# Patient Record
Sex: FEMALE | Race: WHITE | NOT HISPANIC OR LATINO | Employment: OTHER | URBAN - METROPOLITAN AREA
[De-identification: names, ages, dates, MRNs, and addresses within clinical notes are randomized per-mention and may not be internally consistent; named-entity substitution may affect disease eponyms.]

---

## 2017-01-11 RX ORDER — BENAZEPRIL HYDROCHLORIDE 5 MG/1
5 TABLET ORAL NIGHTLY
Qty: 90 TABLET | Refills: 3 | Status: SHIPPED | OUTPATIENT
Start: 2017-01-11 | End: 2018-02-04 | Stop reason: SDUPTHER

## 2017-01-23 DIAGNOSIS — F03.91 DEMENTIA WITH BEHAVIORAL DISTURBANCE, UNSPECIFIED DEMENTIA TYPE: ICD-10-CM

## 2017-01-23 RX ORDER — QUETIAPINE FUMARATE 50 MG/1
TABLET, FILM COATED ORAL
Qty: 90 TABLET | Refills: 3 | Status: SHIPPED | OUTPATIENT
Start: 2017-01-23 | End: 2017-06-06 | Stop reason: SDUPTHER

## 2017-03-27 ENCOUNTER — TELEPHONE (OUTPATIENT)
Dept: INTERNAL MEDICINE | Facility: CLINIC | Age: 77
End: 2017-03-27

## 2017-03-27 DIAGNOSIS — Z12.31 VISIT FOR SCREENING MAMMOGRAM: Primary | ICD-10-CM

## 2017-03-27 NOTE — TELEPHONE ENCOUNTER
----- Message from Trip Otto sent at 3/27/2017  8:39 AM CDT -----  Contact: self [phone]  Type: Orders Request    What orders/ testing are being requested? Mammogram     Is there a future appointment scheduled for the patient with PCP? Yes     When? no    Comments: please advise , Thanks !

## 2017-04-04 ENCOUNTER — TELEPHONE (OUTPATIENT)
Dept: INTERNAL MEDICINE | Facility: CLINIC | Age: 77
End: 2017-04-04

## 2017-04-04 ENCOUNTER — HOSPITAL ENCOUNTER (OUTPATIENT)
Dept: RADIOLOGY | Facility: HOSPITAL | Age: 77
Discharge: HOME | End: 2017-04-04
Attending: INTERNAL MEDICINE
Payer: MEDICARE

## 2017-04-04 DIAGNOSIS — Z12.31 VISIT FOR SCREENING MAMMOGRAM: ICD-10-CM

## 2017-04-04 DIAGNOSIS — E78.5 HYPERLIPIDEMIA, UNSPECIFIED HYPERLIPIDEMIA TYPE: Primary | ICD-10-CM

## 2017-04-04 DIAGNOSIS — R73.09 ELEVATED GLUCOSE: ICD-10-CM

## 2017-04-04 PROCEDURE — 77067 SCR MAMMO BI INCL CAD: CPT | Mod: 26,,, | Performed by: RADIOLOGY

## 2017-04-04 PROCEDURE — 77067 SCR MAMMO BI INCL CAD: CPT | Mod: TC

## 2017-04-04 NOTE — TELEPHONE ENCOUNTER
----- Message from Gregg Valdes sent at 4/4/2017 11:00 AM CDT -----  Contact: Amy Daughter -[phone]  Amy is requesting a call back regarding her Mom the above pt, please advice    Thanks

## 2017-04-05 ENCOUNTER — LAB VISIT (OUTPATIENT)
Dept: LAB | Facility: HOSPITAL | Age: 77
End: 2017-04-05
Attending: INTERNAL MEDICINE
Payer: MEDICARE

## 2017-04-05 DIAGNOSIS — E78.5 HYPERLIPIDEMIA, UNSPECIFIED HYPERLIPIDEMIA TYPE: ICD-10-CM

## 2017-04-05 DIAGNOSIS — R73.09 ELEVATED GLUCOSE: ICD-10-CM

## 2017-04-05 LAB
ALBUMIN SERPL BCP-MCNC: 3.3 G/DL
ALP SERPL-CCNC: 97 U/L
ALT SERPL W/O P-5'-P-CCNC: 30 U/L
ANION GAP SERPL CALC-SCNC: 10 MMOL/L
AST SERPL-CCNC: 27 U/L
BILIRUB SERPL-MCNC: 0.5 MG/DL
BUN SERPL-MCNC: 20 MG/DL
CALCIUM SERPL-MCNC: 9.4 MG/DL
CHLORIDE SERPL-SCNC: 103 MMOL/L
CHOLEST/HDLC SERPL: 4.1 {RATIO}
CO2 SERPL-SCNC: 28 MMOL/L
CREAT SERPL-MCNC: 1.1 MG/DL
EST. GFR  (AFRICAN AMERICAN): 56 ML/MIN/1.73 M^2
EST. GFR  (NON AFRICAN AMERICAN): 48.5 ML/MIN/1.73 M^2
ESTIMATED AVG GLUCOSE: 131 MG/DL
GLUCOSE SERPL-MCNC: 138 MG/DL
HBA1C MFR BLD HPLC: 6.2 %
HDL/CHOLESTEROL RATIO: 24.3 %
HDLC SERPL-MCNC: 185 MG/DL
HDLC SERPL-MCNC: 45 MG/DL
LDLC SERPL CALC-MCNC: 96.6 MG/DL
NONHDLC SERPL-MCNC: 140 MG/DL
POTASSIUM SERPL-SCNC: 4.3 MMOL/L
PROT SERPL-MCNC: 7.1 G/DL
SODIUM SERPL-SCNC: 141 MMOL/L
TRIGL SERPL-MCNC: 217 MG/DL

## 2017-04-05 PROCEDURE — 83036 HEMOGLOBIN GLYCOSYLATED A1C: CPT

## 2017-04-05 PROCEDURE — 36415 COLL VENOUS BLD VENIPUNCTURE: CPT

## 2017-04-05 PROCEDURE — 80061 LIPID PANEL: CPT

## 2017-04-05 PROCEDURE — 80053 COMPREHEN METABOLIC PANEL: CPT

## 2017-04-28 ENCOUNTER — OFFICE VISIT (OUTPATIENT)
Dept: OPHTHALMOLOGY | Facility: CLINIC | Age: 77
End: 2017-04-28
Payer: MEDICARE

## 2017-04-28 DIAGNOSIS — Z96.1 PSEUDOPHAKIA OF BOTH EYES: ICD-10-CM

## 2017-04-28 DIAGNOSIS — H35.3190 NONEXUDATIVE AGE-RELATED MACULAR DEGENERATION: ICD-10-CM

## 2017-04-28 DIAGNOSIS — H35.3132 NONEXUDATIVE AGE-RELATED MACULAR DEGENERATION, BILATERAL, INTERMEDIATE DRY STAGE: ICD-10-CM

## 2017-04-28 DIAGNOSIS — H35.371 EPIRETINAL MEMBRANE, RIGHT: ICD-10-CM

## 2017-04-28 DIAGNOSIS — H35.363 DRUSEN OF MACULA, BILATERAL: ICD-10-CM

## 2017-04-28 DIAGNOSIS — H40.89 GLAUCOMA DUE TO COMBINATION OF MECHANISMS: Primary | ICD-10-CM

## 2017-04-28 PROCEDURE — 99999 PR PBB SHADOW E&M-EST. PATIENT-LVL III: CPT | Mod: PBBFAC,,, | Performed by: OPHTHALMOLOGY

## 2017-04-28 PROCEDURE — 92014 COMPRE OPH EXAM EST PT 1/>: CPT | Mod: S$GLB,,, | Performed by: OPHTHALMOLOGY

## 2017-04-28 RX ORDER — TIMOLOL MALEATE 5 MG/ML
1 SOLUTION OPHTHALMIC DAILY
Qty: 15 ML | Refills: 3 | Status: SHIPPED | OUTPATIENT
Start: 2017-04-28 | End: 2017-09-26

## 2017-04-28 RX ORDER — BIMATOPROST 0.1 MG/ML
1 SOLUTION/ DROPS OPHTHALMIC EVERY MORNING
Qty: 10 ML | Refills: 3 | Status: SHIPPED | OUTPATIENT
Start: 2017-04-28 | End: 2017-11-09 | Stop reason: SDUPTHER

## 2017-04-28 NOTE — PROGRESS NOTES
HPI     Glaucoma    Additional comments: IOP ck and DFE           Comments   DLS: 10/10/16  (Pt with Alzheimer's and does not communicate much anymore)    1) MMG  2) PCIOL OU  3) ERM OD  4) ARMD OS>OD  5) Alzheimer's    MED:  Timolol GFS QAM OU  Lumigan QHS OU       Last edited by Jackie Deutsch MA on 4/28/2017  8:24 AM. (History)            Assessment /Plan     For exam results, see Encounter Report.    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  Combination Mechanism Glaucoma -  Moderate stage        First HVF 2012        First photos 1/13/2012        H/O PI OD - done pre - [facility] - elsewhere        PI OS [facility] 12/22/1011           Family history    neg        Glaucoma meds    T 0.5 gf ou / Lumigan ou q hs        H/O adverse rxn to glaucoma drops    none        LASERS    PI OD - elsewhere // PI OS 12/22/2011        GLAUCOMA SURGERIES    none        OTHER EYE SURGERIES    PC IOL OS 4/11/2012 - [facility] // PC IOL OD - 6/24/2013        CDR    0.5/0.3        Tbase    24-29 / 22-25          Tmax    29/25            Ttarget    /             HVF    6 tests 2012 to 2016 -SAD / NS OD  // no longer can do test os - inattentive - 2/2 alzheimer                   NO FURTHER VF TESTING - PT CAN NO LONGER FOCUS ON TEST          Gonio    +3 / NA to +3        CCT    508 / 515 (thin ou)(+2 ou)        OCT    3 test 2012  To 2015 - RNFL - OD:dec. I // OS:nl        HRT    3 test 2011 to  2015 - MR - nl od / nl os (NO MORE TESTING)         Disc photos    2012, 2013 - [facility]    - Ttoday   15/16  - Test done today    DFE/IOP check      2. ERM OD   3. ARMD OU   4. PC IOL OS 4/11/2012 - [facility]  SN60WF 21.5 D  Note: + post pressure    PC IOL OD 6/24/2013   5.  Refractive error          S/p  CE w/ IOL insertion OU   6. Early Alzheimers  Did ok with local for CE in the first eye  4/11/2012        PLAN      [facility] // S/P PI's ou   - continue timolol gfs  Ou q am   - cont  lumigan OU   IOPs ok  ou  today      - ARMD OS>OD, nonexudative  - AREDS vitamins  - Cont Amsler monitoring  - OCT macula showing ERM OD with traction and ARMD without any exudate- can continue to follow with Dr. Grimm    - cont timolol gfs  ou q am   - cont lumigan ou q hs (runs out a wk early)(try and get them to give 10 mls q 3 months instead of 7.5 mls)     F/U - 9-12 months Gonio/IOP check     NO FURTHER VF TESTING - PT NOT ATTENTIVE ENOUGH TO DO TEST       Following with Dr. Grimm  for dry armd ou / ERM od - yearly exams     I have seen and personally examined the patient.  I agree with the findings, assessment and plan of the resident and/or fellow.     Karina Gordon MD

## 2017-05-08 RX ORDER — QUETIAPINE FUMARATE 25 MG/1
25 TABLET, FILM COATED ORAL 3 TIMES DAILY
Qty: 270 TABLET | Refills: 3 | Status: SHIPPED | OUTPATIENT
Start: 2017-05-08 | End: 2017-06-06 | Stop reason: SDUPTHER

## 2017-06-06 ENCOUNTER — OFFICE VISIT (OUTPATIENT)
Dept: INTERNAL MEDICINE | Facility: CLINIC | Age: 77
End: 2017-06-06
Payer: MEDICARE

## 2017-06-06 VITALS
HEART RATE: 68 BPM | HEIGHT: 60 IN | DIASTOLIC BLOOD PRESSURE: 70 MMHG | SYSTOLIC BLOOD PRESSURE: 114 MMHG | BODY MASS INDEX: 30.29 KG/M2 | WEIGHT: 154.31 LBS

## 2017-06-06 DIAGNOSIS — F03.918 NEURODEGENERATIVE DEMENTIA WITH BEHAVIORAL DISTURBANCE: Primary | ICD-10-CM

## 2017-06-06 DIAGNOSIS — I10 ESSENTIAL HYPERTENSION: ICD-10-CM

## 2017-06-06 DIAGNOSIS — F03.91 DEMENTIA WITH BEHAVIORAL DISTURBANCE, UNSPECIFIED DEMENTIA TYPE: ICD-10-CM

## 2017-06-06 DIAGNOSIS — E78.5 HYPERLIPIDEMIA, UNSPECIFIED HYPERLIPIDEMIA TYPE: ICD-10-CM

## 2017-06-06 DIAGNOSIS — R01.1 CARDIAC MURMUR: ICD-10-CM

## 2017-06-06 PROCEDURE — 99499 UNLISTED E&M SERVICE: CPT | Mod: S$GLB,,, | Performed by: INTERNAL MEDICINE

## 2017-06-06 PROCEDURE — 1159F MED LIST DOCD IN RCRD: CPT | Mod: S$GLB,,, | Performed by: INTERNAL MEDICINE

## 2017-06-06 PROCEDURE — 99999 PR PBB SHADOW E&M-EST. PATIENT-LVL III: CPT | Mod: PBBFAC,,, | Performed by: INTERNAL MEDICINE

## 2017-06-06 PROCEDURE — 1126F AMNT PAIN NOTED NONE PRSNT: CPT | Mod: S$GLB,,, | Performed by: INTERNAL MEDICINE

## 2017-06-06 PROCEDURE — 99214 OFFICE O/P EST MOD 30 MIN: CPT | Mod: S$GLB,,, | Performed by: INTERNAL MEDICINE

## 2017-06-06 PROCEDURE — 1157F ADVNC CARE PLAN IN RCRD: CPT | Mod: S$GLB,,, | Performed by: INTERNAL MEDICINE

## 2017-06-06 RX ORDER — QUETIAPINE FUMARATE 50 MG/1
50 TABLET, FILM COATED ORAL NIGHTLY
Qty: 90 TABLET | Refills: 3 | Status: SHIPPED | OUTPATIENT
Start: 2017-06-06 | End: 2018-06-25 | Stop reason: SDUPTHER

## 2017-06-06 RX ORDER — HYDROCHLOROTHIAZIDE 12.5 MG/1
12.5 TABLET ORAL DAILY
Qty: 90 TABLET | Refills: 3 | Status: SHIPPED | OUTPATIENT
Start: 2017-06-06 | End: 2018-02-22 | Stop reason: SDUPTHER

## 2017-06-06 RX ORDER — QUETIAPINE FUMARATE 25 MG/1
25 TABLET, FILM COATED ORAL
Qty: 360 TABLET | Refills: 3 | Status: SHIPPED | OUTPATIENT
Start: 2017-06-06 | End: 2018-05-07 | Stop reason: SDUPTHER

## 2017-06-06 RX ORDER — MEMANTINE HYDROCHLORIDE 10 MG/1
10 TABLET ORAL 2 TIMES DAILY
Qty: 180 TABLET | Refills: 3 | Status: SHIPPED | OUTPATIENT
Start: 2017-06-06 | End: 2018-02-08 | Stop reason: SDUPTHER

## 2017-06-06 RX ORDER — DONEPEZIL HYDROCHLORIDE 10 MG/1
TABLET, FILM COATED ORAL
Qty: 90 TABLET | Refills: 3 | Status: SHIPPED | OUTPATIENT
Start: 2017-06-06 | End: 2018-06-25 | Stop reason: SDUPTHER

## 2017-06-06 RX ORDER — SIMVASTATIN 40 MG/1
40 TABLET, FILM COATED ORAL NIGHTLY
Qty: 90 TABLET | Refills: 3 | Status: SHIPPED | OUTPATIENT
Start: 2017-06-06 | End: 2018-06-11 | Stop reason: SDUPTHER

## 2017-06-06 NOTE — PROGRESS NOTES
CHIEF COMPLAINT:  Followup.    HISTORY OF PRESENT ILLNESS:  The patient is a 76-year-old female who we see for   hypertension, hyperlipidemia as well as dementia, who comes in today for routine   followup.  She is here today with her daughter.  No new complaints except for   problems with her medications, all her medications that are in the capsule form.    She has been chewing and spitting out.  This includes her HCTZ as well as her   Namenda.  The patient is taking Seroquel, which helped with her mood to   stabilize it.  Her daughter is currently giving her 25 mg at breakfast, lunch   and dinner as well as 75 mg at bedtime.  In regards to her blood pressure, she   does check it, meaning the patient's daughter checks the patient's blood   pressure once a week, it is usually in the 120s/70s.    REVIEW OF SYSTEMS:  Not able to properly obtain from the patient due to her   dementia.  Please see review of systems that was provided by the daughter.  The   patient is eating well and having good bowel movements.  No complaints.    PHYSICAL EXAMINATION:  GENERAL APPEARANCE:  No acute distress.  HEENT:  Trachea is midline without JVD.  PULMONARY:  Good inspiratory, expiratory breath sounds are heard.  Lungs are   clear to auscultation.  CARDIOVASCULAR:  S1, S2 with a I-II/VI systolic ejection murmur heard best at   the right upper sternal border.  EXTREMITIES:  Without edema.  ABDOMEN:  Nontender, nondistended, without hepatosplenomegaly.    ASSESSMENT:  1.  Dementia.  2.  Hypertension.  3.  Hyperlipidemia.  4.  Cardiac murmur.    PLAN:  We will put the patient in for a cardiac echo.  The patient already had   blood tests done in April.  We will change her HCTZ from capsule to a tablet.    We will also change her Namenda from once a day capsule to 10 mg b.i.d.      JDS/IN  dd: 06/06/2017 09:33:10 (CDT)  td: 06/07/2017 05:01:36 (CDT)  Doc ID   #[id number]  Job ID #[id number]    CC:       Answers for HPI/ROS submitted by the patient  on 6/5/2017   activity change: No  unexpected weight change: No  neck pain: No  hearing loss: No  rhinorrhea: No  trouble swallowing: No  eye discharge: No  visual disturbance: No  chest tightness: No  wheezing: No  chest pain: No  palpitations: No  blood in stool: No  constipation: No  vomiting: No  diarrhea: No  polydipsia: No  polyuria: No  difficulty urinating: No  hematuria: No  menstrual problem: No  dysuria: No  joint swelling: No  arthralgias: No  headaches: No  weakness: No  confusion: No  dysphoric mood: No

## 2017-06-12 ENCOUNTER — HOSPITAL ENCOUNTER (OUTPATIENT)
Dept: CARDIOLOGY | Facility: CLINIC | Age: 77
Discharge: HOME | End: 2017-06-12
Payer: MEDICARE

## 2017-06-12 DIAGNOSIS — R01.1 CARDIAC MURMUR: ICD-10-CM

## 2017-06-12 LAB
AORTIC VALVE STENOSIS: ABNORMAL
ESTIMATED PA SYSTOLIC PRESSURE: 32.95
MITRAL VALVE STENOSIS: ABNORMAL
RETIRED EF AND QEF - SEE NOTES: 60 (ref 55–65)
TRICUSPID VALVE REGURGITATION: ABNORMAL

## 2017-06-12 PROCEDURE — 93306 TTE W/DOPPLER COMPLETE: CPT | Mod: S$GLB,,, | Performed by: INTERNAL MEDICINE

## 2017-06-13 ENCOUNTER — PATIENT MESSAGE (OUTPATIENT)
Dept: ADMINISTRATIVE | Facility: OTHER | Age: 77
End: 2017-06-13

## 2017-08-15 ENCOUNTER — HOSPITAL ENCOUNTER (OUTPATIENT)
Dept: RADIOLOGY | Facility: HOSPITAL | Age: 77
Discharge: HOME | End: 2017-08-15
Attending: ORTHOPAEDIC SURGERY
Payer: MEDICARE

## 2017-08-15 ENCOUNTER — OFFICE VISIT (OUTPATIENT)
Dept: INTERNAL MEDICINE | Facility: CLINIC | Age: 77
End: 2017-08-15
Payer: MEDICARE

## 2017-08-15 ENCOUNTER — OFFICE VISIT (OUTPATIENT)
Dept: ORTHOPEDICS | Facility: CLINIC | Age: 77
End: 2017-08-15
Payer: MEDICARE

## 2017-08-15 VITALS
DIASTOLIC BLOOD PRESSURE: 62 MMHG | SYSTOLIC BLOOD PRESSURE: 118 MMHG | BODY MASS INDEX: 30.55 KG/M2 | HEIGHT: 60 IN | HEART RATE: 80 BPM | WEIGHT: 155.63 LBS

## 2017-08-15 VITALS — HEIGHT: 60 IN | WEIGHT: 155.63 LBS | BODY MASS INDEX: 30.55 KG/M2

## 2017-08-15 DIAGNOSIS — G89.29 CHRONIC PAIN OF RIGHT KNEE: ICD-10-CM

## 2017-08-15 DIAGNOSIS — H40.89 GLAUCOMA DUE TO COMBINATION OF MECHANISMS: ICD-10-CM

## 2017-08-15 DIAGNOSIS — H35.3132 NONEXUDATIVE AGE-RELATED MACULAR DEGENERATION, BILATERAL, INTERMEDIATE DRY STAGE: ICD-10-CM

## 2017-08-15 DIAGNOSIS — I77.9 BILATERAL CAROTID ARTERY DISEASE: ICD-10-CM

## 2017-08-15 DIAGNOSIS — M17.0 PRIMARY OSTEOARTHRITIS OF BOTH KNEES: ICD-10-CM

## 2017-08-15 DIAGNOSIS — M25.561 CHRONIC PAIN OF RIGHT KNEE: ICD-10-CM

## 2017-08-15 DIAGNOSIS — I70.0 AORTIC CALCIFICATION: ICD-10-CM

## 2017-08-15 DIAGNOSIS — Z96.652 TOTAL KNEE REPLACEMENT STATUS, LEFT: Primary | ICD-10-CM

## 2017-08-15 DIAGNOSIS — N18.30 CKD (CHRONIC KIDNEY DISEASE) STAGE 3, GFR 30-59 ML/MIN: ICD-10-CM

## 2017-08-15 DIAGNOSIS — Z96.652 TOTAL KNEE REPLACEMENT STATUS, LEFT: ICD-10-CM

## 2017-08-15 DIAGNOSIS — I10 ESSENTIAL HYPERTENSION: ICD-10-CM

## 2017-08-15 DIAGNOSIS — D32.9 MENINGIOMA: ICD-10-CM

## 2017-08-15 DIAGNOSIS — E66.9 OBESITY, UNSPECIFIED OBESITY SEVERITY, UNSPECIFIED OBESITY TYPE: ICD-10-CM

## 2017-08-15 DIAGNOSIS — F03.918 NEURODEGENERATIVE DEMENTIA WITH BEHAVIORAL DISTURBANCE: ICD-10-CM

## 2017-08-15 DIAGNOSIS — I35.0 MILD AORTIC STENOSIS: ICD-10-CM

## 2017-08-15 DIAGNOSIS — E78.5 HYPERLIPIDEMIA, UNSPECIFIED HYPERLIPIDEMIA TYPE: ICD-10-CM

## 2017-08-15 DIAGNOSIS — Z00.00 ENCOUNTER FOR PREVENTIVE HEALTH EXAMINATION: Primary | ICD-10-CM

## 2017-08-15 DIAGNOSIS — M81.0 OSTEOPOROSIS, UNSPECIFIED OSTEOPOROSIS TYPE, UNSPECIFIED PATHOLOGICAL FRACTURE PRESENCE: ICD-10-CM

## 2017-08-15 PROCEDURE — 99499 UNLISTED E&M SERVICE: CPT | Mod: S$GLB,,, | Performed by: NURSE PRACTITIONER

## 2017-08-15 PROCEDURE — 99213 OFFICE O/P EST LOW 20 MIN: CPT | Mod: S$GLB,,, | Performed by: ORTHOPAEDIC SURGERY

## 2017-08-15 PROCEDURE — 1159F MED LIST DOCD IN RCRD: CPT | Mod: S$GLB,,, | Performed by: ORTHOPAEDIC SURGERY

## 2017-08-15 PROCEDURE — 3008F BODY MASS INDEX DOCD: CPT | Mod: S$GLB,,, | Performed by: ORTHOPAEDIC SURGERY

## 2017-08-15 PROCEDURE — 3074F SYST BP LT 130 MM HG: CPT | Mod: S$GLB,,, | Performed by: ORTHOPAEDIC SURGERY

## 2017-08-15 PROCEDURE — 73560 X-RAY EXAM OF KNEE 1 OR 2: CPT | Mod: 26,59,RT, | Performed by: RADIOLOGY

## 2017-08-15 PROCEDURE — 73562 X-RAY EXAM OF KNEE 3: CPT | Mod: 26,LT,, | Performed by: RADIOLOGY

## 2017-08-15 PROCEDURE — 1126F AMNT PAIN NOTED NONE PRSNT: CPT | Mod: S$GLB,,, | Performed by: ORTHOPAEDIC SURGERY

## 2017-08-15 PROCEDURE — 99999 PR PBB SHADOW E&M-EST. PATIENT-LVL IV: CPT | Mod: PBBFAC,,, | Performed by: NURSE PRACTITIONER

## 2017-08-15 PROCEDURE — 3078F DIAST BP <80 MM HG: CPT | Mod: S$GLB,,, | Performed by: ORTHOPAEDIC SURGERY

## 2017-08-15 PROCEDURE — G0439 PPPS, SUBSEQ VISIT: HCPCS | Mod: S$GLB,,, | Performed by: NURSE PRACTITIONER

## 2017-08-15 PROCEDURE — 99999 PR PBB SHADOW E&M-EST. PATIENT-LVL II: CPT | Mod: PBBFAC,,, | Performed by: ORTHOPAEDIC SURGERY

## 2017-08-15 PROCEDURE — 1157F ADVNC CARE PLAN IN RCRD: CPT | Mod: S$GLB,,, | Performed by: ORTHOPAEDIC SURGERY

## 2017-08-15 NOTE — PROGRESS NOTES
Layla Arellano presented for a  Medicare AWV and comprehensive Health Risk Assessment today. The following components were reviewed and updated:    · Medical history  · Family History  · Social history  · Allergies and Current Medications  · Health Risk Assessment  · Health Maintenance  · Care Team     ** See Completed Assessments for Annual Wellness Visit within the encounter summary.**       The following assessments were completed:  · Living Situation  · CAGE  · Depression Screening  · Timed Get Up and Go  · Whisper Test  · Cognitive Function Screening  · Nutrition Screening  · ADL Screening  · PAQ Screening    Vitals:    08/15/17 0905   BP: 118/62   BP Location: Left arm   Patient Position: Sitting   Pulse: 80   Weight: 70.6 kg (155 lb 10.3 oz)   Height: 5' (1.524 m)     Body mass index is 30.4 kg/m².     Physical Exam   Constitutional: She appears well-developed. No distress.   HENT:   Head: Normocephalic and atraumatic.   Cardiovascular: Normal rate and regular rhythm.    Murmur heard.  Pulmonary/Chest: Effort normal and breath sounds normal. No respiratory distress. She has no wheezes. She has no rales.   Musculoskeletal: She exhibits no edema, tenderness or deformity.   Neurological: She is alert.   Memory loss, unable to answer questions.   Skin: Skin is warm and dry. She is not diaphoretic.   Psychiatric: She has a normal mood and affect. Her behavior is normal.   Vitals reviewed.        Diagnoses and health risks identified today and associated recommendations/orders:    1. Encounter for preventive health examination  Tdap today.  Dexa scan due 2018.    2. Meningioma  Stable.   Seen on MRI from 5/4/2015.  Followed by Neurology.     3. Bilateral carotid artery disease  Stable.   Seen on imaging from 3/26/2014.  Followed by PCP.     4. Aortic calcification  Stable.   Seen on imaging from 8/29/2016.  Followed by PCP.     5. 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   Continue current  medications.  Followed by Ophthalmology.     6. Nonexudative age-related macular degeneration, bilateral, intermediate dry stage  Stable.   Followed by Ophthalmology.     7. Essential hypertension  Stable.   Continue current medications.  Followed by PCP.     8. Neurodegenerative dementia with behavioral disturbance  Stable.   Continue current medications.  Followed by Neurology.     9. Hyperlipidemia, unspecified hyperlipidemia type  Stable.   Continue current medication.  Followed by PCP.     10. CKD (chronic kidney disease) stage 3, GFR 30-59 ml/min  Stable.   Avoid NSAIDs.   Followed by PCP.     11. Mild aortic stenosis  Stable.   Followed by PCP.     12. Osteoporosis, unspecified osteoporosis type, unspecified pathological fracture presence  Stable.   Continue current medication.  Followed by PCP.     13. Primary osteoarthritis of both knees  Stable.   Followed by Ortho.     14. Obesity, unspecified obesity severity, unspecified obesity type  Stable.   Low fat diet.   Followed by PCP.       Provided Layla with a 5-10 year written screening schedule and personal prevention plan. Recommendations were developed using the USPSTF age appropriate recommendations. Education, counseling, and referrals were provided as needed. After Visit Summary printed and given to patient which includes a list of additional screenings\tests needed.    Return in about 3 months (around 11/13/2017) for follow up with PCP. Return in 1 year for HRA..    Gladys Vazquez NP

## 2017-08-15 NOTE — PATIENT INSTRUCTIONS
Counseling and Referral of Other Preventative  (Italic type indicates deductible and co-insurance are waived)    Patient Name: Layla Arellano  Today's Date: 8/15/2017      SERVICE LIMITATIONS RECOMMENDATION    Vaccines    · Pneumococcal (once after 65)    · Influenza (annually)    · Hepatitis B (if medium/high risk)    · Prevnar 13      Hepatitis B medium/high risk factors:       - End-stage renal disease       - Hemophiliacs who received Factor VII or         IX concentrates       - Clients of institutions for the mentally             retarded       - Persons who live in the same house as          a HepB carrier       - Homosexual men       - Illicit injectable drug abusers     Pneumococcal: Done, no repeat necessary     Influenza: Due fall      Hepatitis B: N/A     Prevnar 13: Done, no repeat necessary    Mammogram (biennial age 50-74)  Annually (age 40 or over)  Last done 4/2017, recommend to repeat every year    Pap (up to age 70 and after 70 if unknown history or abnormal study last 10 years)    N/A        Colorectal cancer screening (to age 75)    · Fecal occult blood test (annual)  · Flexible sigmoidoscopy (5y)  · Screening colonoscopy (10y)  · Barium enema   N/A    Diabetes self-management training (no USPSTF recommendations)  Requires referral by treating physician for patient with diabetes or renal disease. 10 hours of initial DSMT sessions of no less than 30 minutes each in a continuous 12-month period. 2 hours of follow-up DSMT in subsequent years.  N/A    Bone mass measurements (age 65 & older, biennial)  Requires diagnosis related to osteoporosis or estrogen deficiency. Biennial benefit unless patient has history of long-term glucocorticoid  Last done 4/2016, recommend to repeat every 2  years    Glaucoma screening (no USPSTF recommendation)  Diabetes mellitus, family history   , age 50 or over    American, age 65 or over  Last done 4/2017, recommend to repeat every year     Medical nutrition therapy for diabetes or renal disease (no recommended schedule)  Requires referral by treating physician for patient with diabetes or renal disease or kidney transplant within the past 3 years.  Can be provided in same year as diabetes self-management training (DSMT), and CMS recommends medical nutrition therapy take place after DSMT. Up to 3 hours for initial year and 2 hours in subsequent years.  N/A    Cardiovascular screening blood tests (every 5 years)  · Fasting lipid panel  Order as a panel if possible  Done this year, repeat every year    Diabetes screening tests (at least every 3 years, Medicare covers annually or at 6-month intervals for prediabetic patients)  · Fasting blood sugar (FBS) or glucose tolerance test (GTT)  Patient must be diagnosed with one of the following:       - Hypertension       - Dyslipidemia       - Obesity (BMI 30kg/m2)       - Previous elevated impaired FBS or GTT       ... or any two of the following:       - Overweight (BMI 25 but <30)       - Family history of diabetes       - Age 65 or older       - History of gestational diabetes or birth of baby weighing more than 9 pounds  Done 4/2017, repeat in 6 months    HIV screening (annually for increased risk patients)  · HIV-1 and HIV-2 by EIA, or ELDER, rapid antibody test or oral mucosa transudate  Patients must be at increased risk for HIV infection per USPSTF guidelines or pregnant. Tests covered annually for patient at increased risk or as requested by the patient. Pregnant patients may receive up to 3 tests during pregnancy.  Risks discussed, screening is not recommended    Smoking cessation counseling (up to 8 sessions per year)  Patients must be asymptomatic of tobacco-related conditions to receive as a preventative service.  Non-smoker    Subsequent annual wellness visit  At least 12 months since last AWV  Return in one year     The following information is provided to all patients.  This information is to  help you find resources for any of the problems found today that may be affecting your health:                Living healthy guide: www.[facility].[place].[facility]      Understanding Diabetes: www.diabetes.org      Eating healthy: www.cdc.gov/healthyweight      CDC home safety checklist: www.cdc.gov/steadi/patient.html      Agency on Aging: www.goea.[place].[facility]      Alcoholics anonymous (AA): www.aa.org      Physical Activity: www.ezra.nih.gov/kx1fcbs      Tobacco use: www.quitwithusla.org

## 2017-08-15 NOTE — PROGRESS NOTES
HPI:    Layla Arellano is a 76 y.o. female who is here today for   Chief Complaint   Patient presents with    Right Knee - Pain    left knee     TKR 9-12-16   .  Patient is 1 year out from a left total knee and doing well.  She has dementia and is unable to give a clear history.  Family states that she is limping and having a lot of trouble with her right knee.    Duration: 6 months  Intensity: moderate  Character of pain: sharp  Location: She reports that the pain is predominately  medial    Past Medical History:   Diagnosis Date    Alzheimer's disease     Arthritis     bilateral knee pain    CKD (chronic kidney disease) stage 3, GFR 30-59 ml/min 6/12/2015    Dementia         Dementia     Glaucoma     Heart murmur     Aortic stenosis    Hyperlipidemia     Hypertension     Macular degeneration - Both Eyes 5/10/2013    Osteoporosis 5/3/2016          Current Outpatient Prescriptions:     alendronate (FOSAMAX) 70 MG tablet, TAKE 1 TABLET EVERY 7 DAYS. SEE PACKAGE FOR ADDITIONAL INSTRUCTIONS, Disp: 12 tablet, Rfl: 3    benazepril (LOTENSIN) 5 MG tablet, Take 1 tablet (5 mg total) by mouth every evening., Disp: 90 tablet, Rfl: 3    BETA-CAROTENE,A, W-C & E/MIN (OCUVITE ORAL), Take by mouth once daily. , Disp: , Rfl:     calcium carbonate (OS-MARIO) 500 mg calcium (1,250 mg) tablet, Take 1 tablet by mouth once daily., Disp: , Rfl:     donepezil (ARICEPT) 10 MG tablet, TAKE 1 TABLET ONE TIME DAILY, Disp: 90 tablet, Rfl: 3    hydrochlorothiazide (HYDRODIURIL) 12.5 MG Tab, Take 1 tablet (12.5 mg total) by mouth once daily., Disp: 90 tablet, Rfl: 3    LUMIGAN 0.01 % Drop, Place 1 drop into both eyes every morning. Disp 4 bottles of 2.5 mls each or 2 bottles of 5 mls each for 3 months., Disp: 10 mL, Rfl: 3    memantine (NAMENDA) 10 MG Tab, Take 1 tablet (10 mg total) by mouth 2 (two) times daily., Disp: 180 tablet, Rfl: 3    nystatin-triamcinolone (MYCOLOG) ointment, APPLY TOPICALLY 2 (TWO) TIMES  DAILY., Disp: 30 g, Rfl: 2    quetiapine (SEROQUEL) 25 MG Tab, Take 1 tablet (25 mg total) by mouth 4 (four) times daily before meals and nightly., Disp: 360 tablet, Rfl: 3    quetiapine (SEROQUEL) 50 MG tablet, Take 1 tablet (50 mg total) by mouth every evening., Disp: 90 tablet, Rfl: 3    simvastatin (ZOCOR) 40 MG tablet, Take 1 tablet (40 mg total) by mouth every evening., Disp: 90 tablet, Rfl: 3    timolol maleate 0.5% (TIMOPTIC-XE) 0.5 % SolG, Place 1 drop into both eyes once daily. Disp 90 day suply, Disp: 15 mL, Rfl: 3    vitamin D 1000 units Tab, Take 185 mg by mouth every evening. , Disp: , Rfl:      Review of patient's allergies indicates:  No Known Allergies     ROS  Constitutional: Negative for fever, Negative for weight loss  HENT Negative for congestion  Cardiovascular: Negative chest pain  Respiratory: Negative Shortness of breath  Heme: Negative excessive bleeding  Skin:NegativeItching, Negative breakdown  Musculoskeletal:Negative for back pain, Positive for joint pain, Negative muscle pain, Negative muscle weakness  Neurological: Negative for numbness and paresthesias   Psychiatric/Behavioral: Positive altered mental status, Negative for depression    Physical Exam:   Ht 5' (1.524 m)   Wt 70.6 kg (155 lb 10.3 oz)   BMI 30.40 kg/m²   General appearance: This is a well-developed, Well nourished female No obvious acute distress.  Psychiatric: normal mood and affect;  pleasant and conversant; judgment and thought content normal  Gait is coordinated. Patient has antalgic gait to the right  Cardiovascular: There are no swelling or varicosities present.   Respiratory: normal respiratory effort   Lymphatic: no adenopathy   Neurologic: alert and oriented to person, place, and time   Deep Tendon Reflexes are normal;  Coordination and Balance: Normal   Musculoskeletal   Neck    ROM shows normal flexion and extension and lateral rotation    Palpation: Non-tender    Stability is normal    Strength is  normal    Skin is normal without masses and lesions    Sensation is intact to light touch   Back    ROM showsnormal flexion, extension    and  rotation    Palpation shows no masses    Stability is normal    Strength to flexion and extension well maintained    Core strength is diminished    Skin shows no rashes or cafe au lait spots;     Sensation is intact to light touch    Right Knee  Swelling Mild  TendernessMedial joint line  Range of Motion: 120    Left Knee: Swelling None  TendernessNone  Range of Motion: 110    Radiograph   Osteoarthritis: severe  Angle: mild varus    Assessment:   Well-healed left total knee.  Right knee has bone-on-bone arthritis but is still quite functional.  Plan:   No surgical intervention at this time.

## 2017-09-26 RX ORDER — TIMOLOL MALEATE 5 MG/ML
SOLUTION/ DROPS OPHTHALMIC
Qty: 15 ML | Refills: 3 | Status: SHIPPED | OUTPATIENT
Start: 2017-09-26 | End: 2018-06-11 | Stop reason: SDUPTHER

## 2017-09-28 DIAGNOSIS — L30.4 INTERTRIGO: ICD-10-CM

## 2017-09-29 NOTE — TELEPHONE ENCOUNTER
----- Message from Hailey Rose sent at 9/29/2017  9:39 AM CDT -----  Call patient about prescription that was not called in. Call [phone] or [phone]

## 2017-10-03 DIAGNOSIS — L30.4 INTERTRIGO: ICD-10-CM

## 2017-10-03 RX ORDER — NYSTATIN AND TRIAMCINOLONE ACETONIDE 100000; 1 [USP'U]/G; MG/G
OINTMENT TOPICAL
Qty: 30 G | Refills: 2 | Status: SHIPPED | OUTPATIENT
Start: 2017-10-03 | End: 2018-03-20 | Stop reason: SDUPTHER

## 2017-10-03 NOTE — TELEPHONE ENCOUNTER
----- Message from Grace iWlson sent at 10/3/2017  9:12 AM CDT -----  Contact: pts daughter Amy   LISA-pt- pts daughter is calling to speak with the nurse in ref to a prescription refill the Amy said it wasn't called in yet. Nystatin triamcinolne ointment . Pt goes to [facility] on [facility]. Can you please call Amy at [phone] or [phone]    ANA

## 2017-11-09 ENCOUNTER — TELEPHONE (OUTPATIENT)
Dept: NEUROLOGY | Facility: CLINIC | Age: 77
End: 2017-11-09

## 2017-11-09 DIAGNOSIS — H40.89 GLAUCOMA DUE TO COMBINATION OF MECHANISMS: ICD-10-CM

## 2017-11-09 RX ORDER — BIMATOPROST 0.1 MG/ML
SOLUTION/ DROPS OPHTHALMIC
Qty: 2.5 ML | Refills: 12 | Status: SHIPPED | OUTPATIENT
Start: 2017-11-09 | End: 2018-04-20 | Stop reason: SDUPTHER

## 2017-11-13 ENCOUNTER — IMMUNIZATION (OUTPATIENT)
Dept: INTERNAL MEDICINE | Facility: CLINIC | Age: 77
End: 2017-11-13
Payer: MEDICARE

## 2017-11-13 ENCOUNTER — LAB VISIT (OUTPATIENT)
Dept: LAB | Facility: HOSPITAL | Age: 77
End: 2017-11-13
Attending: INTERNAL MEDICINE
Payer: MEDICARE

## 2017-11-13 ENCOUNTER — OFFICE VISIT (OUTPATIENT)
Dept: INTERNAL MEDICINE | Facility: CLINIC | Age: 77
End: 2017-11-13
Payer: MEDICARE

## 2017-11-13 VITALS
HEART RATE: 85 BPM | OXYGEN SATURATION: 96 % | BODY MASS INDEX: 30.52 KG/M2 | SYSTOLIC BLOOD PRESSURE: 118 MMHG | WEIGHT: 155.44 LBS | DIASTOLIC BLOOD PRESSURE: 70 MMHG | HEIGHT: 60 IN

## 2017-11-13 DIAGNOSIS — I10 ESSENTIAL HYPERTENSION: ICD-10-CM

## 2017-11-13 DIAGNOSIS — G30.9 ALZHEIMER'S DEMENTIA WITH BEHAVIORAL DISTURBANCE, UNSPECIFIED TIMING OF DEMENTIA ONSET: ICD-10-CM

## 2017-11-13 DIAGNOSIS — E78.5 HYPERLIPIDEMIA, UNSPECIFIED HYPERLIPIDEMIA TYPE: ICD-10-CM

## 2017-11-13 DIAGNOSIS — Z00.00 ANNUAL PHYSICAL EXAM: ICD-10-CM

## 2017-11-13 DIAGNOSIS — Z00.00 ANNUAL PHYSICAL EXAM: Primary | ICD-10-CM

## 2017-11-13 DIAGNOSIS — F02.81 ALZHEIMER'S DEMENTIA WITH BEHAVIORAL DISTURBANCE, UNSPECIFIED TIMING OF DEMENTIA ONSET: ICD-10-CM

## 2017-11-13 LAB
ALBUMIN SERPL BCP-MCNC: 3.5 G/DL
ALP SERPL-CCNC: 87 U/L
ALT SERPL W/O P-5'-P-CCNC: 42 U/L
ANION GAP SERPL CALC-SCNC: 11 MMOL/L
AST SERPL-CCNC: 27 U/L
BASOPHILS # BLD AUTO: 0.05 K/UL
BASOPHILS NFR BLD: 0.4 %
BILIRUB SERPL-MCNC: 0.4 MG/DL
BUN SERPL-MCNC: 17 MG/DL
CALCIUM SERPL-MCNC: 10.1 MG/DL
CHLORIDE SERPL-SCNC: 102 MMOL/L
CHOLEST SERPL-MCNC: 195 MG/DL
CHOLEST/HDLC SERPL: 4.2 {RATIO}
CO2 SERPL-SCNC: 28 MMOL/L
CREAT SERPL-MCNC: 1 MG/DL
DIFFERENTIAL METHOD: ABNORMAL
EOSINOPHIL # BLD AUTO: 0.3 K/UL
EOSINOPHIL NFR BLD: 2.4 %
ERYTHROCYTE [DISTWIDTH] IN BLOOD BY AUTOMATED COUNT: 14.3 %
EST. GFR  (AFRICAN AMERICAN): >60 ML/MIN/1.73 M^2
EST. GFR  (NON AFRICAN AMERICAN): 54 ML/MIN/1.73 M^2
GLUCOSE SERPL-MCNC: 144 MG/DL
HCT VFR BLD AUTO: 47 %
HDLC SERPL-MCNC: 46 MG/DL
HDLC SERPL: 23.6 %
HGB BLD-MCNC: 15.3 G/DL
LDLC SERPL CALC-MCNC: 104.2 MG/DL
LYMPHOCYTES # BLD AUTO: 3.5 K/UL
LYMPHOCYTES NFR BLD: 25.1 %
MCH RBC QN AUTO: 29.3 PG
MCHC RBC AUTO-ENTMCNC: 32.6 G/DL
MCV RBC AUTO: 90 FL
MONOCYTES # BLD AUTO: 0.9 K/UL
MONOCYTES NFR BLD: 6.5 %
NEUTROPHILS # BLD AUTO: 9.1 K/UL
NEUTROPHILS NFR BLD: 65.3 %
NONHDLC SERPL-MCNC: 149 MG/DL
PLATELET # BLD AUTO: 276 K/UL
PMV BLD AUTO: 12.8 FL
POTASSIUM SERPL-SCNC: 4.3 MMOL/L
PROT SERPL-MCNC: 7.4 G/DL
RBC # BLD AUTO: 5.23 M/UL
SODIUM SERPL-SCNC: 141 MMOL/L
TRIGL SERPL-MCNC: 224 MG/DL
WBC # BLD AUTO: 13.92 K/UL

## 2017-11-13 PROCEDURE — 85025 COMPLETE CBC W/AUTO DIFF WBC: CPT

## 2017-11-13 PROCEDURE — G0008 ADMIN INFLUENZA VIRUS VAC: HCPCS | Mod: S$GLB,,, | Performed by: INTERNAL MEDICINE

## 2017-11-13 PROCEDURE — 36415 COLL VENOUS BLD VENIPUNCTURE: CPT

## 2017-11-13 PROCEDURE — 99999 PR PBB SHADOW E&M-EST. PATIENT-LVL III: CPT | Mod: PBBFAC,,, | Performed by: INTERNAL MEDICINE

## 2017-11-13 PROCEDURE — 99499 UNLISTED E&M SERVICE: CPT | Mod: S$GLB,,, | Performed by: INTERNAL MEDICINE

## 2017-11-13 PROCEDURE — 99397 PER PM REEVAL EST PAT 65+ YR: CPT | Mod: S$GLB,,, | Performed by: INTERNAL MEDICINE

## 2017-11-13 PROCEDURE — 80053 COMPREHEN METABOLIC PANEL: CPT

## 2017-11-13 PROCEDURE — 80061 LIPID PANEL: CPT

## 2017-11-13 PROCEDURE — 90662 IIV NO PRSV INCREASED AG IM: CPT | Mod: S$GLB,,, | Performed by: INTERNAL MEDICINE

## 2017-11-14 ENCOUNTER — PATIENT MESSAGE (OUTPATIENT)
Dept: DERMATOLOGY | Facility: CLINIC | Age: 77
End: 2017-11-14

## 2017-11-14 ENCOUNTER — PATIENT MESSAGE (OUTPATIENT)
Dept: INTERNAL MEDICINE | Facility: CLINIC | Age: 77
End: 2017-11-14

## 2017-11-14 DIAGNOSIS — D72.829 LEUKOCYTOSIS, UNSPECIFIED TYPE: ICD-10-CM

## 2017-11-14 DIAGNOSIS — R73.09 ELEVATED GLUCOSE: Primary | ICD-10-CM

## 2017-11-14 RX ORDER — NYSTATIN AND TRIAMCINOLONE ACETONIDE 100000; 1 [USP'U]/G; MG/G
OINTMENT TOPICAL
Qty: 30 G | Refills: 1 | Status: ON HOLD | OUTPATIENT
Start: 2017-11-14 | End: 2019-06-30 | Stop reason: HOSPADM

## 2017-11-14 NOTE — PROGRESS NOTES
CHIEF COMPLAINT:  Physical exam.    HISTORY OF PRESENT ILLNESS:  The patient is a 77-year-old female who comes in   today for annual physical exam.  The patient is currently being treated for   dementia.  Her symptoms appear to be getting worse.  Her daughter reports she is   less verbal.  She also is having problem with hygiene.  Her daughter now to   clean her up, still have accidents, but she is not violent.    REVIEW OF SYSTEMS:  Unable to properly obtain secondary to the patient's   Alzheimer's.  The patient did have a mammogram in April of this year.    PHYSICAL EXAMINATION:  GENERAL APPEARANCE:  No acute distress.  The patient appears to be well taken   care of.  HEENT:  Conjunctivae clear.  She does have bilateral lens replacements.  TMs are   clear.  Nasal septum is midline without discharge.  Oropharynx is without   erythema.  NECK:  Trachea is midline without JVD, without thyromegaly.  PULMONARY:  Good inspiratory, expiratory breath sounds are heard.  Lungs are   clear to auscultation.  CARDIOVASCULAR:  S1, S2 with a 1/6 systolic ejection murmur.  EXTREMITIES:  With trace edema.  ABDOMEN:  Nontender, nondistended, without hepatosplenomegaly.    ASSESSMENT:  1.  Physical exam.  2.  Hypertension.  3.  Hyperlipidemia.  4.  Alzheimer dementia.    PLAN:  We will put the patient in for CBC, CMP, and lipid panel.  She is to   follow up pending results.      KELSEY/REENA  dd: 11/14/2017 07:31:48 (CST)  td: 11/15/2017 02:09:13 (CST)  Doc ID   #[id number]  Job ID #[id number]    CC:

## 2017-11-16 ENCOUNTER — PATIENT MESSAGE (OUTPATIENT)
Dept: INTERNAL MEDICINE | Facility: CLINIC | Age: 77
End: 2017-11-16

## 2017-11-16 ENCOUNTER — LAB VISIT (OUTPATIENT)
Dept: LAB | Facility: HOSPITAL | Age: 77
End: 2017-11-16
Attending: INTERNAL MEDICINE
Payer: MEDICARE

## 2017-11-16 DIAGNOSIS — D72.829 LEUKOCYTOSIS, UNSPECIFIED TYPE: Primary | ICD-10-CM

## 2017-11-16 DIAGNOSIS — D72.829 LEUKOCYTOSIS, UNSPECIFIED TYPE: ICD-10-CM

## 2017-11-16 DIAGNOSIS — R73.09 ELEVATED GLUCOSE: ICD-10-CM

## 2017-11-16 DIAGNOSIS — N39.0 ACUTE UTI: ICD-10-CM

## 2017-11-16 LAB
BASOPHILS # BLD AUTO: 0.05 K/UL
BASOPHILS NFR BLD: 0.3 %
DIFFERENTIAL METHOD: ABNORMAL
EOSINOPHIL # BLD AUTO: 0.4 K/UL
EOSINOPHIL NFR BLD: 3 %
ERYTHROCYTE [DISTWIDTH] IN BLOOD BY AUTOMATED COUNT: 14.3 %
ESTIMATED AVG GLUCOSE: 143 MG/DL
HBA1C MFR BLD HPLC: 6.6 %
HCT VFR BLD AUTO: 47.8 %
HGB BLD-MCNC: 15.6 G/DL
LYMPHOCYTES # BLD AUTO: 4.2 K/UL
LYMPHOCYTES NFR BLD: 28.6 %
MCH RBC QN AUTO: 29.2 PG
MCHC RBC AUTO-ENTMCNC: 32.6 G/DL
MCV RBC AUTO: 90 FL
MONOCYTES # BLD AUTO: 1.5 K/UL
MONOCYTES NFR BLD: 10 %
NEUTROPHILS # BLD AUTO: 8.5 K/UL
NEUTROPHILS NFR BLD: 57.8 %
PLATELET # BLD AUTO: 279 K/UL
PMV BLD AUTO: 12.9 FL
RBC # BLD AUTO: 5.34 M/UL
WBC # BLD AUTO: 14.64 K/UL

## 2017-11-16 PROCEDURE — 83036 HEMOGLOBIN GLYCOSYLATED A1C: CPT

## 2017-11-16 PROCEDURE — 85025 COMPLETE CBC W/AUTO DIFF WBC: CPT

## 2017-11-16 PROCEDURE — 36415 COLL VENOUS BLD VENIPUNCTURE: CPT

## 2017-11-16 RX ORDER — CIPROFLOXACIN 750 MG/1
750 TABLET, FILM COATED ORAL DAILY
Qty: 7 TABLET | Refills: 0 | Status: SHIPPED | OUTPATIENT
Start: 2017-11-16 | End: 2017-12-18

## 2017-11-20 NOTE — TELEPHONE ENCOUNTER
Spoke with pt's daughter and informed her that Dr. Han is out of town but I will return her call about doing the in and out catherization once med's are finish once I hear by from Dr. Han.

## 2017-11-28 ENCOUNTER — TELEPHONE (OUTPATIENT)
Dept: INTERNAL MEDICINE | Facility: CLINIC | Age: 77
End: 2017-11-28

## 2017-11-28 NOTE — TELEPHONE ENCOUNTER
----- Message from Vera Almanza sent at 11/28/2017  1:04 PM CST -----  Contact: Amy/tona/[phone] or [phone]/cell  Amy called in regards needing to talk with Dr Han nurse about if patient have to go back to have some blood work because she finished her antibiotics. Please call and advise.       Thank you!!!

## 2017-12-04 ENCOUNTER — TELEPHONE (OUTPATIENT)
Dept: NEUROLOGY | Facility: CLINIC | Age: 77
End: 2017-12-04

## 2017-12-04 NOTE — TELEPHONE ENCOUNTER
Called patient's daughter to inform appointment on 12\12 will be cancelled. Offered to assist scheduling   With other providers; daughter stated needs after January. Advised if she would like to wait for Vi, she may have availability come January. patient daughter agreed.

## 2017-12-18 ENCOUNTER — OFFICE VISIT (OUTPATIENT)
Dept: OPHTHALMOLOGY | Facility: CLINIC | Age: 77
End: 2017-12-18
Payer: MEDICARE

## 2017-12-18 DIAGNOSIS — Z96.1 PSEUDOPHAKIA OF BOTH EYES: ICD-10-CM

## 2017-12-18 DIAGNOSIS — H35.3132 NONEXUDATIVE AGE-RELATED MACULAR DEGENERATION, BILATERAL, INTERMEDIATE DRY STAGE: Primary | ICD-10-CM

## 2017-12-18 DIAGNOSIS — H40.89 GLAUCOMA DUE TO COMBINATION OF MECHANISMS: ICD-10-CM

## 2017-12-18 PROCEDURE — 92226 PR SPECIAL EYE EXAM, SUBSEQUENT: CPT | Mod: 50,S$GLB,, | Performed by: OPHTHALMOLOGY

## 2017-12-18 PROCEDURE — 99999 PR PBB SHADOW E&M-EST. PATIENT-LVL III: CPT | Mod: PBBFAC,,, | Performed by: OPHTHALMOLOGY

## 2017-12-18 PROCEDURE — 92012 INTRM OPH EXAM EST PATIENT: CPT | Mod: S$GLB,,, | Performed by: OPHTHALMOLOGY

## 2017-12-18 NOTE — PROGRESS NOTES
HPI     Yearly ARMD ck   DLS- 12/14/2016 Dr. Lama    78 Y/O F w/ Dementia is here today with her daughter ( Amy) for her 1   yr ARMD CK.    Pt Daughter states she doesn't talk much so she is uncertain about change   in vision.  Pt's family notes no change in visual behavior.  She does not   seem to complain about eyes.      (-)Flashes (-)Floaters  (-)Photophobia  (-)Glare  Takes Ocuvite daily    MED:   Timolol BID OU   Lumigan QHS OS     POHx:   1) MMG   2) PCIOL OU   3) ERM OD   4) Early Alzheimer's       Negative Family History     Dementia = living with daughter.         Last edited by Michael Lama MD on 12/18/2017  5:17 PM. (History)        Unable to obtain OCT secondary to cooperation    Assessment /Plan     For exam results, see Encounter Report.    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 oGrdon  Can try to schedule followups between the two of us yearly, but 6 months apart

## 2017-12-19 RX ORDER — ALENDRONATE SODIUM 70 MG/1
TABLET ORAL
Qty: 12 TABLET | Refills: 3 | Status: SHIPPED | OUTPATIENT
Start: 2017-12-19 | End: 2018-08-29 | Stop reason: SDUPTHER

## 2018-02-05 ENCOUNTER — OFFICE VISIT (OUTPATIENT)
Dept: NEUROLOGY | Facility: CLINIC | Age: 78
End: 2018-02-05
Payer: MEDICARE

## 2018-02-05 VITALS
DIASTOLIC BLOOD PRESSURE: 81 MMHG | SYSTOLIC BLOOD PRESSURE: 147 MMHG | HEIGHT: 60 IN | WEIGHT: 153.88 LBS | BODY MASS INDEX: 30.21 KG/M2 | HEART RATE: 101 BPM

## 2018-02-05 DIAGNOSIS — F03.918 NEURODEGENERATIVE DEMENTIA WITH BEHAVIORAL DISTURBANCE: Primary | ICD-10-CM

## 2018-02-05 PROCEDURE — 3008F BODY MASS INDEX DOCD: CPT | Mod: S$GLB,,, | Performed by: NURSE PRACTITIONER

## 2018-02-05 PROCEDURE — 1126F AMNT PAIN NOTED NONE PRSNT: CPT | Mod: S$GLB,,, | Performed by: NURSE PRACTITIONER

## 2018-02-05 PROCEDURE — 99999 PR PBB SHADOW E&M-EST. PATIENT-LVL III: CPT | Mod: PBBFAC,,, | Performed by: NURSE PRACTITIONER

## 2018-02-05 PROCEDURE — 99213 OFFICE O/P EST LOW 20 MIN: CPT | Mod: S$GLB,,, | Performed by: NURSE PRACTITIONER

## 2018-02-05 PROCEDURE — 99499 UNLISTED E&M SERVICE: CPT | Mod: S$GLB,,, | Performed by: NURSE PRACTITIONER

## 2018-02-05 PROCEDURE — 1159F MED LIST DOCD IN RCRD: CPT | Mod: S$GLB,,, | Performed by: NURSE PRACTITIONER

## 2018-02-05 RX ORDER — BENAZEPRIL HYDROCHLORIDE 5 MG/1
5 TABLET ORAL NIGHTLY
Qty: 90 TABLET | Refills: 3 | Status: SHIPPED | OUTPATIENT
Start: 2018-02-05 | End: 2019-01-23 | Stop reason: SDUPTHER

## 2018-02-05 NOTE — PROGRESS NOTES
Name: Layla Arellano  MRN: [id number]   CSN: [id number]      Date: 2-5-18      Referring physician:  Mushtaq Self  No address on file    Subjective:      Chief Complaint:     History of Present Illness (HPI):    Layla Arellano is a 77 y.o. left-handed female who presents today for a follow-up evaluation of Dementia and is accompanied by daughter, Amy. Last seen in office in 2016. At that time, Aricept and Namenda cont without change. Seroquel cont for behavioral issues that remained.   She lives with Amy who is her primary caregiver (24/7) and her family. She gets a little help from one of Amy's sisters (Sarita) every couple of weeks. There is another son and daughter who have not spoken to or not seen her in two years.   Taking quetiapine during day and at bedtime. All of her meds prescribed by PCP.   Daughter does not think she recognizes her but seems to feel comfortable with her.     Appetite great. She does feed herself.   She does not verbalize when she is hungry. In fact, does not speak much at all.   She is led to the bedroom for bedtime.   Does not appear to get lost inside of home.   Does not wander.   Generally in a fairly good mood. Coloring is her hobby.   She is able to walk but needs to be prompted.   Able to dress herself but not undress herself. She does not pick out her own clothes but is not choosy about what her daughter sets out for her.   She sometimes assists with bathing and brushing teeth.   She does not have any problems with swallowing or choking.   Must be prompted to go to the bathroom every few hours but still continent of bowel and bladder. No longer able to wipe after toileting.     No constipation or diarrhea.  No pain.   Weight stable.      Pt not able to answer ROS.   Review of Systems    Past Medical History: The patient  has a past medical history of Alzheimer's disease; Arthritis; CKD (chronic kidney disease) stage 3, GFR 30-59 ml/min (6/12/2015); Dementia; Dementia;  Glaucoma; Heart murmur; Hyperlipidemia; Hypertension; Macular degeneration - Both Eyes (5/10/2013); Obesity (8/15/2017); and Osteoporosis (5/3/2016).    Social History: The patient  reports that she has never smoked. She has never used smokeless tobacco. She reports that she does not drink alcohol or use drugs.    Family History: Their family history includes Cancer (age of onset: 70) in her mother; Glaucoma in her maternal grandmother; Heart attack (age of onset: 50) in her father; Heart disease in her daughter; Stroke in her mother.    Allergies: Patient has no known allergies.     Meds:   Current Outpatient Prescriptions on File Prior to Visit   Medication Sig Dispense Refill    alendronate (FOSAMAX) 70 MG tablet TAKE 1 TABLET EVERY 7 DAYS. SEE PACKAGE FOR ADDITIONAL INSTRUCTIONS 12 tablet 3    BETA-CAROTENE,A, W-C & E/MIN (OCUVITE ORAL) Take by mouth once daily.       calcium carbonate (OS-MARIO) 500 mg calcium (1,250 mg) tablet Take 1 tablet by mouth once daily.      donepezil (ARICEPT) 10 MG tablet TAKE 1 TABLET ONE TIME DAILY 90 tablet 3    hydrochlorothiazide (HYDRODIURIL) 12.5 MG Tab Take 1 tablet (12.5 mg total) by mouth once daily. 90 tablet 3    LUMIGAN 0.01 % Drop INSTILL 1 DROP INTO BOTH EYES IN THE MORNING 2.5 mL 12    memantine (NAMENDA) 10 MG Tab Take 1 tablet (10 mg total) by mouth 2 (two) times daily. 180 tablet 3    nystatin-triamcinolone (MYCOLOG) ointment APPLY TOPICALLY TO AFFECTED AREA 2 (TWO) TIMES DAILY. 30 g 1    nystatin-triamcinolone (MYCOLOG) ointment APPLY TOPICALLY 2 (TWO) TIMES DAILY. 30 g 2    quetiapine (SEROQUEL) 25 MG Tab Take 1 tablet (25 mg total) by mouth 4 (four) times daily before meals and nightly. 360 tablet 3    quetiapine (SEROQUEL) 50 MG tablet Take 1 tablet (50 mg total) by mouth every evening. 90 tablet 3    simvastatin (ZOCOR) 40 MG tablet Take 1 tablet (40 mg total) by mouth every evening. 90 tablet 3    timolol maleate 0.5% (TIMOPTIC) 0.5 % Drop INSTILL  1 DROP INTO BOTH EYES EVERY MORNING 15 mL 3    vitamin D 1000 units Tab Take 185 mg by mouth every evening.       benazepril (LOTENSIN) 5 MG tablet Take 1 tablet (5 mg total) by mouth every evening. 90 tablet 3     No current facility-administered medications on file prior to visit.        Objective:     Physical Exam:    Vitals:    02/05/18 0907   BP: (!) 147/81   Pulse: 101   Weight: 69.8 kg (153 lb 14.1 oz)   Height: 5' (1.524 m)     Body mass index is 30.05 kg/m².    Constitutional  Well-developed, well-nourished, appears stated age, well groomed, calm.   Cardiovascular  Radial pulses 2+ and symmetric, no LE edema bilaterally     ..  Neurological    * Mental status  MOCA = deferred   - Orientation Not oriented, not even to person. When asked her name, she laughs and says she does not know anymore. This is the only time she is verbal during visit. Smiles periodically. Holds coloring book and pencils and smiles, shows these to me.      - Memory     Impaired     - Attention/concentration  Decreased     - Language  Essentially non-verbal     - Fund of knowledge  Impaired     - Executive  Impaired   GAIT  Arises with assist. Smiles but appears guarded when she walks, relies on assist of dtr to hold her hand and tell her what to do.                Laboratory Results:  Lab Visit on 11/16/2017   Component Date Value Ref Range Status    WBC 11/16/2017 14.64* 3.90 - 12.70 K/uL Final    RBC 11/16/2017 5.34  4.00 - 5.40 M/uL Final    Hemoglobin 11/16/2017 15.6  12.0 - 16.0 g/dL Final    Hematocrit 11/16/2017 47.8  37.0 - 48.5 % Final    MCV 11/16/2017 90  82 - 98 fL Final    MCH 11/16/2017 29.2  27.0 - 31.0 pg Final    MCHC 11/16/2017 32.6  32.0 - 36.0 g/dL Final    RDW 11/16/2017 14.3  11.5 - 14.5 % Final    Platelets 11/16/2017 279  150 - 350 K/uL Final    MPV 11/16/2017 12.9  9.2 - 12.9 fL Final    Gran # (ANC) 11/16/2017 8.5* 1.8 - 7.7 K/uL Final    Lymph # 11/16/2017 4.2  1.0 - 4.8 K/uL Final    Mono #  11/16/2017 1.5* 0.3 - 1.0 K/uL Final    Eos # 11/16/2017 0.4  0.0 - 0.5 K/uL Final    Baso # 11/16/2017 0.05  0.00 - 0.20 K/uL Final    Gran% 11/16/2017 57.8  38.0 - 73.0 % Final    Lymph% 11/16/2017 28.6  18.0 - 48.0 % Final    Mono% 11/16/2017 10.0  4.0 - 15.0 % Final    Eosinophil% 11/16/2017 3.0  0.0 - 8.0 % Final    Basophil% 11/16/2017 0.3  0.0 - 1.9 % Final    Differential Method 11/16/2017 Automated   Final    Hemoglobin A1C 11/16/2017 6.6* 4.0 - 5.6 % Final    Estimated Avg Glucose 11/16/2017 143* 68 - 131 mg/dL Final   Lab Visit on 11/13/2017   Component Date Value Ref Range Status    Sodium 11/13/2017 141  136 - 145 mmol/L Final    Potassium 11/13/2017 4.3  3.5 - 5.1 mmol/L Final    Chloride 11/13/2017 102  95 - 110 mmol/L Final    CO2 11/13/2017 28  23 - 29 mmol/L Final    Glucose 11/13/2017 144* 70 - 110 mg/dL Final    BUN, Bld 11/13/2017 17  8 - 23 mg/dL Final    Creatinine 11/13/2017 1.0  0.5 - 1.4 mg/dL Final    Calcium 11/13/2017 10.1  8.7 - 10.5 mg/dL Final    Total Protein 11/13/2017 7.4  6.0 - 8.4 g/dL Final    Albumin 11/13/2017 3.5  3.5 - 5.2 g/dL Final    Total Bilirubin 11/13/2017 0.4  0.1 - 1.0 mg/dL Final    Alkaline Phosphatase 11/13/2017 87  55 - 135 U/L Final    AST 11/13/2017 27  10 - 40 U/L Final    ALT 11/13/2017 42  10 - 44 U/L Final    Anion Gap 11/13/2017 11  8 - 16 mmol/L Final    eGFR if African American 11/13/2017 >60  >60 mL/min/1.73 m^2 Final    eGFR if non African American 11/13/2017 54* >60 mL/min/1.73 m^2 Final    Cholesterol 11/13/2017 195  120 - 199 mg/dL Final    Triglycerides 11/13/2017 224* 30 - 150 mg/dL Final    HDL 11/13/2017 46  40 - 75 mg/dL Final    LDL Cholesterol 11/13/2017 104.2  63.0 - 159.0 mg/dL Final    HDL/Chol Ratio 11/13/2017 23.6  20.0 - 50.0 % Final    Total Cholesterol/HDL Ratio 11/13/2017 4.2  2.0 - 5.0 Final    Non-HDL Cholesterol 11/13/2017 149  mg/dL Final    WBC 11/13/2017 13.92* 3.90 - 12.70 K/uL Final    RBC  11/13/2017 5.23  4.00 - 5.40 M/uL Final    Hemoglobin 11/13/2017 15.3  12.0 - 16.0 g/dL Final    Hematocrit 11/13/2017 47.0  37.0 - 48.5 % Final    MCV 11/13/2017 90  82 - 98 fL Final    MCH 11/13/2017 29.3  27.0 - 31.0 pg Final    MCHC 11/13/2017 32.6  32.0 - 36.0 g/dL Final    RDW 11/13/2017 14.3  11.5 - 14.5 % Final    Platelets 11/13/2017 276  150 - 350 K/uL Final    MPV 11/13/2017 12.8  9.2 - 12.9 fL Final    Gran # (ANC) 11/13/2017 9.1* 1.8 - 7.7 K/uL Final    Lymph # 11/13/2017 3.5  1.0 - 4.8 K/uL Final    Mono # 11/13/2017 0.9  0.3 - 1.0 K/uL Final    Eos # 11/13/2017 0.3  0.0 - 0.5 K/uL Final    Baso # 11/13/2017 0.05  0.00 - 0.20 K/uL Final    Gran% 11/13/2017 65.3  38.0 - 73.0 % Final    Lymph% 11/13/2017 25.1  18.0 - 48.0 % Final    Mono% 11/13/2017 6.5  4.0 - 15.0 % Final    Eosinophil% 11/13/2017 2.4  0.0 - 8.0 % Final    Basophil% 11/13/2017 0.4  0.0 - 1.9 % Final    Differential Method 11/13/2017 Automated   Final       Imaging:   From 1-2015                  Assessment and Plan     Neurodegenerative dementia with behavioral disturbance        Medical Decision Making:    Ms. Arellano's dementia is consistent with AD.   Continue meds without change.   Call for problems.   Follow up annually, unless needed sooner.  Discussed caregiver burden with dtr. Discussed possible services that may be able to assist give her respite when needed.        I spent 20 minutes face-to-face with the patient with >50% of the time spent with counseling and education regarding:  - results of data, diagnosis, and recommendations stated above  - the prognosis of dementia  - rationale of memory meds    Vi D. Knoop, DNP, NP-C  Division of Movement and Memory Disorders  [facility]  [phone]

## 2018-02-09 RX ORDER — MEMANTINE HYDROCHLORIDE 10 MG/1
TABLET ORAL
Qty: 180 TABLET | Refills: 3 | Status: SHIPPED | OUTPATIENT
Start: 2018-02-09 | End: 2018-08-29 | Stop reason: SDUPTHER

## 2018-02-22 RX ORDER — HYDROCHLOROTHIAZIDE 12.5 MG/1
CAPSULE ORAL
Qty: 90 CAPSULE | Refills: 1 | OUTPATIENT
Start: 2018-02-22

## 2018-02-22 RX ORDER — HYDROCHLOROTHIAZIDE 12.5 MG/1
TABLET ORAL
Qty: 90 TABLET | Refills: 3 | Status: SHIPPED | OUTPATIENT
Start: 2018-02-22 | End: 2018-08-29 | Stop reason: SDUPTHER

## 2018-03-05 ENCOUNTER — TELEPHONE (OUTPATIENT)
Dept: INTERNAL MEDICINE | Facility: CLINIC | Age: 78
End: 2018-03-05

## 2018-03-05 NOTE — TELEPHONE ENCOUNTER
----- Message from Layla Metz sent at 3/5/2018  7:38 AM CST -----  Contact: daughter/ida/[phone]  Pt daughter called in regards to the pt has a sore on her left leg.      Please advise

## 2018-03-06 ENCOUNTER — OFFICE VISIT (OUTPATIENT)
Dept: INTERNAL MEDICINE | Facility: CLINIC | Age: 78
End: 2018-03-06
Payer: MEDICARE

## 2018-03-06 VITALS
HEART RATE: 70 BPM | SYSTOLIC BLOOD PRESSURE: 124 MMHG | HEIGHT: 60 IN | WEIGHT: 154 LBS | BODY MASS INDEX: 30.23 KG/M2 | DIASTOLIC BLOOD PRESSURE: 62 MMHG

## 2018-03-06 DIAGNOSIS — L98.9 SKIN SORE: Primary | ICD-10-CM

## 2018-03-06 DIAGNOSIS — I10 ESSENTIAL HYPERTENSION: ICD-10-CM

## 2018-03-06 DIAGNOSIS — E78.5 HYPERLIPIDEMIA, UNSPECIFIED HYPERLIPIDEMIA TYPE: ICD-10-CM

## 2018-03-06 DIAGNOSIS — G30.9 ALZHEIMER'S DEMENTIA WITH BEHAVIORAL DISTURBANCE, UNSPECIFIED TIMING OF DEMENTIA ONSET: ICD-10-CM

## 2018-03-06 DIAGNOSIS — F02.81 ALZHEIMER'S DEMENTIA WITH BEHAVIORAL DISTURBANCE, UNSPECIFIED TIMING OF DEMENTIA ONSET: ICD-10-CM

## 2018-03-06 PROCEDURE — 3078F DIAST BP <80 MM HG: CPT | Mod: S$GLB,,, | Performed by: INTERNAL MEDICINE

## 2018-03-06 PROCEDURE — 99999 PR PBB SHADOW E&M-EST. PATIENT-LVL III: CPT | Mod: PBBFAC,,, | Performed by: INTERNAL MEDICINE

## 2018-03-06 PROCEDURE — 99499 UNLISTED E&M SERVICE: CPT | Mod: S$GLB,,, | Performed by: INTERNAL MEDICINE

## 2018-03-06 PROCEDURE — 99213 OFFICE O/P EST LOW 20 MIN: CPT | Mod: S$GLB,,, | Performed by: INTERNAL MEDICINE

## 2018-03-06 PROCEDURE — 3074F SYST BP LT 130 MM HG: CPT | Mod: S$GLB,,, | Performed by: INTERNAL MEDICINE

## 2018-03-06 RX ORDER — CEPHALEXIN 500 MG/1
500 CAPSULE ORAL EVERY 8 HOURS
Qty: 21 CAPSULE | Refills: 0 | Status: SHIPPED | OUTPATIENT
Start: 2018-03-06 | End: 2018-03-07 | Stop reason: ALTCHOICE

## 2018-03-06 NOTE — PROGRESS NOTES
CHIEF COMPLAINT:  Sore on leg.    HISTORY OF PRESENT ILLNESS:  The patient is a 77-year-old female with Alzheimer   dementia who comes in today with her daughter who reports that she noticed a   sore on her mom's left leg near the hip.  Her other sister was watching her mom   for the weekend.  No history of trauma.  She notices sore when the patient came   back to her home.  She has been using triple antibiotic ointment, is not getting   any better.    REVIEW OF SYSTEMS:  Unable to properly obtain secondary to the patient's   Alzheimer's for the patient does not indicate any pain.    PHYSICAL EXAMINATION:  PULMONARY:  Good inspiratory, expiratory breath sounds are heard.  Lungs are   clear to auscultation.  CARDIOVASCULAR:  S1, S2.  EXTREMITIES:  Without edema.  ABDOMEN:  Normal bowel sounds.  The patient's left leg was evaluated.  The patient had a 1 cm sore.  It appeared   to be clean based.  There was no exudate, no pus, and no pustule.    ASSESSMENT:  1.  Sore on left leg.  2.  Alzheimer dementia.  3.  Hypertension, currently stable.  4.  Hyperlipidemia.    PLAN:  The patient is to keep the wound clean with just plain soap and water and   use hydrogen peroxide and will keep it covered with a sterile bandage.  We will   put her on Keflex three times a day for the next seven days.      KELSEY/REENA  dd: 03/06/2018 10:16:22 (CST)  td: 03/07/2018 03:09:18 (CST)  Doc ID   #[id number]  Job ID #[id number]    CC:

## 2018-03-07 ENCOUNTER — TELEPHONE (OUTPATIENT)
Dept: INTERNAL MEDICINE | Facility: CLINIC | Age: 78
End: 2018-03-07

## 2018-03-07 RX ORDER — CEPHALEXIN 250 MG/5ML
500 POWDER, FOR SUSPENSION ORAL 3 TIMES DAILY
Qty: 210 ML | Refills: 0 | Status: SHIPPED | OUTPATIENT
Start: 2018-03-07 | End: 2018-04-23

## 2018-03-07 NOTE — TELEPHONE ENCOUNTER
----- Message from Layla David sent at 3/7/2018 11:55 AM CST -----  Contact: Amy/ [phone] or cell [phone]  Patient cannot swallow capsules and she has called several times to ask that you order the antibiotic in a tablet / cephalexin 500mg. If this doesn't come in tablet form please order something else that is in a tablet. She said that this is her third call.    [facility] Prabhjot y [phone]

## 2018-03-08 ENCOUNTER — PATIENT MESSAGE (OUTPATIENT)
Dept: INTERNAL MEDICINE | Facility: CLINIC | Age: 78
End: 2018-03-08

## 2018-03-20 DIAGNOSIS — L30.4 INTERTRIGO: ICD-10-CM

## 2018-03-20 RX ORDER — NYSTATIN AND TRIAMCINOLONE ACETONIDE 100000; 1 [USP'U]/G; MG/G
OINTMENT TOPICAL
Qty: 30 G | Refills: 2 | Status: SHIPPED | OUTPATIENT
Start: 2018-03-20 | End: 2019-04-04 | Stop reason: SDUPTHER

## 2018-03-26 ENCOUNTER — TELEPHONE (OUTPATIENT)
Dept: INTERNAL MEDICINE | Facility: CLINIC | Age: 78
End: 2018-03-26

## 2018-03-26 NOTE — TELEPHONE ENCOUNTER
----- Message from Lucio Landis sent at 3/26/2018  9:07 AM CDT -----  Contact: Daughter/Amy [phone] home or [phone] cell  The patient is asking for a sooner appointment for the sore on her left leg that you saw her for before, it hasn't healed yet.    Thank you

## 2018-03-28 ENCOUNTER — TELEPHONE (OUTPATIENT)
Dept: INTERNAL MEDICINE | Facility: CLINIC | Age: 78
End: 2018-03-28

## 2018-03-28 ENCOUNTER — OFFICE VISIT (OUTPATIENT)
Dept: INTERNAL MEDICINE | Facility: CLINIC | Age: 78
End: 2018-03-28
Payer: MEDICARE

## 2018-03-28 VITALS
BODY MASS INDEX: 29.78 KG/M2 | HEART RATE: 84 BPM | HEIGHT: 60 IN | OXYGEN SATURATION: 97 % | DIASTOLIC BLOOD PRESSURE: 70 MMHG | WEIGHT: 151.69 LBS | SYSTOLIC BLOOD PRESSURE: 120 MMHG

## 2018-03-28 DIAGNOSIS — L89.229: Primary | ICD-10-CM

## 2018-03-28 PROCEDURE — 99213 OFFICE O/P EST LOW 20 MIN: CPT | Mod: S$GLB,,, | Performed by: NURSE PRACTITIONER

## 2018-03-28 PROCEDURE — 99499 UNLISTED E&M SERVICE: CPT | Mod: S$GLB,,, | Performed by: NURSE PRACTITIONER

## 2018-03-28 PROCEDURE — 3078F DIAST BP <80 MM HG: CPT | Mod: CPTII,S$GLB,, | Performed by: NURSE PRACTITIONER

## 2018-03-28 PROCEDURE — 99999 PR PBB SHADOW E&M-EST. PATIENT-LVL V: CPT | Mod: PBBFAC,,, | Performed by: NURSE PRACTITIONER

## 2018-03-28 PROCEDURE — 3074F SYST BP LT 130 MM HG: CPT | Mod: CPTII,S$GLB,, | Performed by: NURSE PRACTITIONER

## 2018-03-28 NOTE — TELEPHONE ENCOUNTER
----- Message from Linsey Shannen sent at 3/28/2018  7:33 AM CDT -----  Contact: call daughter ida  [phone]  or [phone]  Sooner appointment than the  can schedule.  Did you offer to schedule the next available appointment and put the patient on the wait list?:    When is the first available appointment: aug 2018  What is the nature of the appointment: sore on left leg,  Possibly infected   What visit type: same day urgent   Patient preference of timeframe to be scheduled:  Today or asap   Comments: pt was treated by jacinta about 2 weeks ago for same issue

## 2018-03-28 NOTE — PROGRESS NOTES
Subjective:       Patient ID: Layla Arellano is a 77 y.o. female.    Chief Complaint: Cyst    Pt here with daughter with wound to left hip.  Treated a few weeks ago by Dr Han with abx did not heal wound.  Daughter has just been cleaning with hydrogen peroxide daily.  No purulent drainage. Pt with Alzheimer's .        Cyst   Pertinent negatives include no chills or fever.     Review of Systems   Constitutional: Negative for chills and fever.   Skin: Positive for wound.       Objective:      Physical Exam   Constitutional: She appears well-developed and well-nourished. No distress.   Skin: Skin is warm and dry. Capillary refill takes less than 2 seconds. She is not diaphoretic.   Ulcerated lesion to left hip with scabbed center.  Minimal erythema to edges . NO purulent drainage noted    Area cleaned with peroxide  Medi Honey applied with sterile cotton applicator  Non stick dressing applied with paper tape     Nursing note and vitals reviewed.      Assessment:       1. Decubitus ulcer of left hip, unspecified ulcer stage        Plan:       Layla was seen today for cyst.    Diagnoses and all orders for this visit:    Decubitus ulcer of left hip, unspecified ulcer stage  -     Ambulatory Referral to Wound Clinic      Patient Instructions   Use Medi honey twice a day    Wash with soap and water    Follow up with wound care

## 2018-03-28 NOTE — TELEPHONE ENCOUNTER
Spoke with pt caretaker and she already has a SameDay appt scheduled and will keep scheduled appt for pt

## 2018-04-17 ENCOUNTER — OFFICE VISIT (OUTPATIENT)
Dept: WOUND CARE | Facility: CLINIC | Age: 78
End: 2018-04-17
Payer: MEDICARE

## 2018-04-17 VITALS
HEART RATE: 93 BPM | WEIGHT: 151 LBS | HEIGHT: 60 IN | DIASTOLIC BLOOD PRESSURE: 88 MMHG | BODY MASS INDEX: 29.64 KG/M2 | SYSTOLIC BLOOD PRESSURE: 136 MMHG | TEMPERATURE: 98 F

## 2018-04-17 DIAGNOSIS — S71.002A OPEN WOUND OF LEFT HIP, INITIAL ENCOUNTER: ICD-10-CM

## 2018-04-17 DIAGNOSIS — B37.0 THRUSH OF MOUTH AND ESOPHAGUS: ICD-10-CM

## 2018-04-17 DIAGNOSIS — B37.81 THRUSH OF MOUTH AND ESOPHAGUS: ICD-10-CM

## 2018-04-17 DIAGNOSIS — S51.809A: ICD-10-CM

## 2018-04-17 PROCEDURE — 3075F SYST BP GE 130 - 139MM HG: CPT | Mod: CPTII,S$GLB,, | Performed by: NURSE PRACTITIONER

## 2018-04-17 PROCEDURE — 99203 OFFICE O/P NEW LOW 30 MIN: CPT | Mod: S$GLB,,, | Performed by: NURSE PRACTITIONER

## 2018-04-17 PROCEDURE — 99999 PR PBB SHADOW E&M-EST. PATIENT-LVL V: CPT | Mod: PBBFAC,,, | Performed by: NURSE PRACTITIONER

## 2018-04-17 PROCEDURE — 3079F DIAST BP 80-89 MM HG: CPT | Mod: CPTII,S$GLB,, | Performed by: NURSE PRACTITIONER

## 2018-04-17 NOTE — PROGRESS NOTES
Subjective:       Patient ID: Layla Arellano is a 77 y.o. female.    Chief Complaint: Wound Check    HPI   This is a 77 year old female referred by Kimberly Call NP for evaluation and management of a wound to the left hip.  The wound has been there for 2 months.  She has used triple antibiotic ointment on the wound and then had a course of oral antibiotics.  Most recently she is using medihoney gel on the wound.  She also has some wounds on both forearms caused by her picking at the skin. She is afebrile.  She is non-communicative and all information is obtained through her daughter.  She does not appear to be in pain when the wound is cleaned.  Review of Systems   Unable to perform ROS: Dementia       Objective:      Physical Exam   Constitutional: She appears well-developed and well-nourished. She appears listless. No distress.   HENT:   Head: Normocephalic and atraumatic.   Mouth/Throat: Oropharynx is clear and moist.   Eyes: Conjunctivae and EOM are normal. Pupils are equal, round, and reactive to light. Right eye exhibits no discharge. Left eye exhibits no discharge. No scleral icterus.   Neck: Neck supple. No JVD present. No tracheal deviation present. No thyromegaly present.   Cardiovascular: Normal rate, regular rhythm, normal heart sounds and intact distal pulses.  Exam reveals no gallop and no friction rub.    No murmur heard.  Pulmonary/Chest: Effort normal and breath sounds normal. No respiratory distress. She has no wheezes. She has no rales.   Abdominal: Soft. Bowel sounds are normal. She exhibits no distension. There is no tenderness.   Musculoskeletal: She exhibits no edema or tenderness.        Arms:       Legs:  Neurological: She appears listless.   Skin: Skin is warm and dry. No rash noted. She is not diaphoretic. No erythema.   Psychiatric: Her affect is blunt. She is slowed and withdrawn. She is noncommunicative.   Nursing note and vitals reviewed.      Assessment:       1. Open wound of  left hip, initial encounter    2. Open wound of forearm, unspecified laterality, initial encounter    3. Thrush of mouth and esophagus        Plan:            Nystatin swish and swallow four times daily as directed.  Apply medihoney gel daily to left hip wound, cover with cotton gauze and secure with tegaderm.  Tegaderm to both forearm wounds.  Change weekly and as needed.  Return to clinic in two weeks.    Left lateral hip    Left forearm    Right forearm

## 2018-04-17 NOTE — PATIENT INSTRUCTIONS
Candida Infection: Thrush  Thrush is a type of fungal infection in the mouth and throat. Thrush does not usually affect healthy adults. It is more common in people with a weakened immune system. It is also more likely if you take antibiotics. Thrush is normally not contagious.  Understanding fungus in the mouth and throat  Your mouth and throat normally contain millions of tiny organisms. These include bacteria and yeasts. Many of these do not cause any problems. In fact, they may help fight disease.  Yeasts are a type of fungus. A type of yeast called Candida normally lives on your skin. It is also found on the membranes of your mouth and throat. Usually, this yeast grows only in small amounts and is harmless. But in some cases, Candida can grow out of control and cause thrush. Thrush is related to other kinds of Candida infections that can grow all over the body. Thrush refers to an infection of only the mouth and throat.  What causes thrush?  Thrush happens when something lets too much Candida grow inside your mouth and throat. Certain things that change the normal balance of organisms in the mouth can lead to thrush. One example is antibiotic medicine. This medicine may kill some of the normal bacteria in your mouth. Candida can then grow freely. People on antibiotics have an increased risk for thrush.  You have a higher risk for thrush if you:  · Wear dentures  · Are getting chemotherapy  · Are getting radiation therapy  · Have diabetes  · Have a transplanted organ  · Use corticosteroids  · Have a weakened immune system, such as from AIDS  · Are an older adult  Symptoms of thrush  Some people with thrush do not have any symptoms. Symptoms of thrush can include:  · A dry, cottony feeling in your mouth  · Loss of taste  · Pain while eating or swallowing  · White or red patches inside your mouth or on the back of your throat  Diagnosing thrush  Your health care provider will ask about your medical history and  your symptoms. He or she will look closely at your mouth and throat. White or red patches will be scraped with a tongue depressor. The sample will be sent to a lab to test. This test can usually confirm thrush.  If you have thrush, you may also have esophageal candidiasis. This is common in people who have HIV. Your health care provider may check for this condition with an upper endoscopy. This is a procedure to look at the esophagus. A tissue sample may be taken to test.  Treatment for thrush  It is important to treat thrush early. Untreated, it may turn into a more serious form of widespread infection. Thrush is usually treated with antifungal medicine. The medicine is put directly in your mouth and throat. You may be given a swish and swallow medicine or an antifungal lozenge.  In some cases, you may need an antifungal pill. This can remove Candida throughout your body. Or you may need medicine through an IV. These treatments depend on how severe your infection is, and what other health conditions you have.  If you are at high risk for thrush, you may need to keep taking oral antifungal medicine. This is to help prevent thrush in the future.  What happens if you dont get treated for thrush?  If untreated, the Candida may spread throughout your body. They may even enter your bloodstream. This can cause serious problems, such as organ failure and even death. Bloodstream infection may need to be treated with high doses of antifungal medicine through an IV.  Systemic infection is much more likely in people who are very ill. It is also more common in those who have serious problems with their immune system. Additional risk factors for systemic infection in very ill people include:  · Central venous lines  · IV nutrition  · Broad-spectrum antibiotics  · Kidney failure  · Recent surgery  Preventing thrush  You may be able to help prevent some cases of thrush. Make sure to:  · Practice good oral hygiene. Try using a  chlorhexidine mouthwash.  · Clean your dentures regularly as instructed. Make sure they fit you correctly.  · After using a corticosteroid inhaler, rinse out your mouth with water or mouthwash.  · Do not use broad-spectrum antibiotics, if possible.  · Get treated for health problems that increase your risk for thrush, such as diabetes.     When to call the health care provider  Call your health care provider right away if you have any of these:  · Cottony feeling in your mouth  · Loss of taste  · Pain while eating or swallowing  · White or red patches inside your mouth or on the back of your throat   Date Last Reviewed: 3/19/2015  © 3424-9411 [employer]. [address] [place] [address], [place] [address]. All rights reserved. This information is not intended as a substitute for professional medical care. Always follow your healthcare professional's instructions.      Apply medihoney gel daily to left hip wound, cover with cotton 2x2 and secure with tegaderm dressing.    Apply tegaderm dressing to arm wounds.  Bandage may stay in place for up to one week if patient does not remove it.

## 2018-04-17 NOTE — LETTER
April 17, 2018      Kimberly Call, FNP-C  [address] [facility]  [address]  [facility] [address]           [facility]  [address]  [facility] [address]  Phone: [phone]          Patient: Layla Arellano   MR Number: [id number]   YOB: 1940   Date of Visit: 4/17/2018       Dear Kimberly Call:    Thank you for referring Layla Arellano to me for evaluation. Attached you will find relevant portions of my assessment and plan of care.    If you have questions, please do not hesitate to call me. I look forward to following Layla Arellano along with you.    Sincerely,    Drea Velasquez NP    Enclosure  CC:  No Recipients    If you would like to receive this communication electronically, please contact [email or web address] or [phone] to request more information on [email or web address] Link access.    For providers and/or their staff who would like to refer a patient to [facility], please contact us through our one-stop-shop provider referral line, [facility], at [phone].    If you feel you have received this communication in error or would no longer like to receive these types of communications, please e-mail [email or web address]

## 2018-04-20 ENCOUNTER — PES CALL (OUTPATIENT)
Dept: ADMINISTRATIVE | Facility: CLINIC | Age: 78
End: 2018-04-20

## 2018-04-20 DIAGNOSIS — H40.89 GLAUCOMA DUE TO COMBINATION OF MECHANISMS: ICD-10-CM

## 2018-04-23 ENCOUNTER — OFFICE VISIT (OUTPATIENT)
Dept: OPHTHALMOLOGY | Facility: CLINIC | Age: 78
End: 2018-04-23
Payer: MEDICARE

## 2018-04-23 DIAGNOSIS — H40.89 GLAUCOMA DUE TO COMBINATION OF MECHANISMS: Primary | ICD-10-CM

## 2018-04-23 DIAGNOSIS — H35.3132 NONEXUDATIVE AGE-RELATED MACULAR DEGENERATION, BILATERAL, INTERMEDIATE DRY STAGE: ICD-10-CM

## 2018-04-23 DIAGNOSIS — H35.371 EPIRETINAL MEMBRANE, RIGHT: ICD-10-CM

## 2018-04-23 DIAGNOSIS — Z96.1 PSEUDOPHAKIA OF BOTH EYES: ICD-10-CM

## 2018-04-23 DIAGNOSIS — H26.499 POSTERIOR CAPSULAR OPACIFICATION, UNSPECIFIED LATERALITY: ICD-10-CM

## 2018-04-23 DIAGNOSIS — H35.363 DRUSEN OF MACULA, BILATERAL: ICD-10-CM

## 2018-04-23 PROCEDURE — 99999 PR PBB SHADOW E&M-EST. PATIENT-LVL I: CPT | Mod: PBBFAC,,, | Performed by: OPHTHALMOLOGY

## 2018-04-23 PROCEDURE — 92012 INTRM OPH EXAM EST PATIENT: CPT | Mod: S$GLB,,, | Performed by: OPHTHALMOLOGY

## 2018-04-23 PROCEDURE — 99499 UNLISTED E&M SERVICE: CPT | Mod: S$PBB,,, | Performed by: OPHTHALMOLOGY

## 2018-04-23 RX ORDER — BIMATOPROST 0.1 MG/ML
SOLUTION/ DROPS OPHTHALMIC
Qty: 3 BOTTLE | Refills: 3 | Status: SHIPPED | OUTPATIENT
Start: 2018-04-23 | End: 2018-04-23 | Stop reason: SDUPTHER

## 2018-04-23 RX ORDER — BIMATOPROST 0.1 MG/ML
1 SOLUTION/ DROPS OPHTHALMIC EVERY MORNING
Qty: 4 BOTTLE | Refills: 3 | Status: SHIPPED | OUTPATIENT
Start: 2018-04-23 | End: 2018-05-04 | Stop reason: SDUPTHER

## 2018-04-23 NOTE — PROGRESS NOTES
HPI     DLS: 4/28/17    Pt here for IOP check;   Pt is present with daughter. Pt's daughter states that she doesn't   complain about her eyes at all.     Meds: T 1/2 qam ou             Lumigan qam ou    1) MMG   2) PCIOL OU   3) ERM OD   4) Early Alzheimer's         Last edited by Laura Woodard MD on 4/23/2018  9:07 AM.   (History)            Assessment /Plan     For exam results, see Encounter Report.    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  Combination Mechanism Glaucoma -  Moderate stage        First HVF 2012        First photos 1/13/2012        H/O PI OD - done pre - [facility] - elsewhere        PI OS [facility] 12/22/1011           Family history    neg        Glaucoma meds    T 0.5 gf ou / Lumigan ou q hs        H/O adverse rxn to glaucoma drops    none        LASERS    PI OD - elsewhere // PI OS 12/22/2011        GLAUCOMA SURGERIES    none        OTHER EYE SURGERIES    PC IOL OS 4/11/2012 - [facility] // PC IOL OD - 6/24/2013        CDR    0.5/0.3        Tbase    24-29 / 22-25          Tmax    29/25            Ttarget    /     ?        HVF    6 tests 2012 to 2016 -SAD / NS OD  // no longer can do test os - inattentive - 2/2 alzheimer                   NO FURTHER VF TESTING - PT CAN NO LONGER FOCUS ON TEST          Gonio    +3 / NA to +3        CCT    508 / 515 (thin ou)(+2 ou)        OCT    3 test 2012  To 2015 - RNFL - OD:dec. I // OS:nl        HRT    3 test 2011 to  2015 - MR - nl od / nl os (NO MORE TESTING)         Disc photos    2012, 2013 - [facility]     - Ttoday   14/14  - Test done today    IOP, gonio      2. ERM OD   3. ARMD OU  - ARMD OS>OD, nonexudative  - AREDS vitamins  - Cont Amsler monitoring  - OCT macula showing ERM OD with traction and ARMD without any exudate- can continue to follow with Dr. Lama   4. PC IOL OS  4/11/2012 - [facility]  SN60WF 21.5 D  Note: + post pressure    PC IOL OD 6/24/2013   5.  Refractive error          S/p CE w/ IOL insertion OU   6. Early Alzheimers  Did ok with local for CE in the first eye  4/11/2012          PLAN        Mixed [facility] Glc // S/P PI's ou   - IOPs ok  ou  today  - continue timolol gfs q am OU  - cont  lumigan qam OU   (runs out of lumigan a wk early)(try and get them to give 10 mls q 3 months instead of 7.5 mls)      F/U - 9-12 months - IOP check / DFE - no longer able to do HVF's // OCT's ect

## 2018-04-23 NOTE — PROGRESS NOTES
HPI     DLS: 4/28/17    Pt here for IOP check;   Pt is present with daughter. Pt's daughter states that she doesn't   complain about her eyes at all.     Meds: T 1/2 qam ou             Lumigan qam ou    1) MMG   2) PCIOL OU   3) ERM OD   4) Early Alzheimer's         Last edited by Laura Woodard MD on 4/23/2018  9:07 AM.   (History)            Assessment /Plan     For exam results, see Encounter Report.    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

## 2018-05-04 DIAGNOSIS — H40.89 GLAUCOMA DUE TO COMBINATION OF MECHANISMS: ICD-10-CM

## 2018-05-07 RX ORDER — QUETIAPINE FUMARATE 25 MG/1
25 TABLET, FILM COATED ORAL
Qty: 360 TABLET | Refills: 3 | Status: SHIPPED | OUTPATIENT
Start: 2018-05-07 | End: 2019-05-07

## 2018-05-09 ENCOUNTER — OFFICE VISIT (OUTPATIENT)
Dept: WOUND CARE | Facility: CLINIC | Age: 78
End: 2018-05-09
Payer: MEDICARE

## 2018-05-09 VITALS
HEIGHT: 60 IN | HEART RATE: 84 BPM | DIASTOLIC BLOOD PRESSURE: 78 MMHG | BODY MASS INDEX: 28.93 KG/M2 | TEMPERATURE: 97 F | SYSTOLIC BLOOD PRESSURE: 126 MMHG | WEIGHT: 147.38 LBS

## 2018-05-09 DIAGNOSIS — S51.809A: ICD-10-CM

## 2018-05-09 DIAGNOSIS — S71.002A OPEN WOUND OF LEFT HIP, INITIAL ENCOUNTER: Primary | ICD-10-CM

## 2018-05-09 PROCEDURE — 99212 OFFICE O/P EST SF 10 MIN: CPT | Mod: S$GLB,,, | Performed by: NURSE PRACTITIONER

## 2018-05-09 PROCEDURE — 3078F DIAST BP <80 MM HG: CPT | Mod: CPTII,S$GLB,, | Performed by: NURSE PRACTITIONER

## 2018-05-09 PROCEDURE — 99999 PR PBB SHADOW E&M-EST. PATIENT-LVL V: CPT | Mod: PBBFAC,,, | Performed by: NURSE PRACTITIONER

## 2018-05-09 PROCEDURE — 3074F SYST BP LT 130 MM HG: CPT | Mod: CPTII,S$GLB,, | Performed by: NURSE PRACTITIONER

## 2018-05-09 NOTE — PROGRESS NOTES
Subjective:       Patient ID: Layla Arellano is a 77 y.o. female.    Chief Complaint: Wound Check    HPI   This patient is seen today for reevaluation of wounds to the hip and forearms.  The wound has been there for 3 months.  She is using medihoney gel on the hip wound and tegaderm on the arm wounds.  She picks at the wounds and skin making healing difficult.  She also removes the bandages. The arm wounds are healed.  The hip wound is healing as evidenced by wound contracture. She is afebrile.  She is non-communicative and all information is obtained through her daughter.  She does not appear to be in pain when the wound is cleaned.  Review of Systems   Unable to perform ROS: Dementia       Objective:      Physical Exam   Constitutional: She appears well-developed and well-nourished. She appears listless. No distress.   HENT:   Head: Normocephalic and atraumatic.   Cardiovascular: Intact distal pulses.    Musculoskeletal: She exhibits no edema or tenderness.        Arms:       Legs:  Neurological: She appears listless.   Skin: Skin is warm and dry. No rash noted. She is not diaphoretic. No erythema.   Psychiatric: Her affect is blunt. She is slowed and withdrawn. She is noncommunicative.   Nursing note and vitals reviewed.      Assessment:       1. Open wound of left hip, initial encounter    2. Open wound of forearm, unspecified laterality, initial encounter        Plan:            Apply medihoney gel daily to left hip wound, cover with cotton gauze and secure with an island dressing.  Return to clinic in three weeks.    Left lateral hip

## 2018-05-09 NOTE — PATIENT INSTRUCTIONS
You may shower using a mild soap such as Dove.  Irrigate the wound with lukewarm water for 5 minutes and dry thoroughly.  Apply medihoney gel to the wound, cover with cotton gauze and secure with an island dressing.  Change dressing daily.  Report any signs of infection.

## 2018-06-11 DIAGNOSIS — E78.5 HYPERLIPIDEMIA, UNSPECIFIED HYPERLIPIDEMIA TYPE: ICD-10-CM

## 2018-06-11 RX ORDER — TIMOLOL MALEATE 5 MG/ML
SOLUTION/ DROPS OPHTHALMIC
Qty: 15 ML | Refills: 3 | Status: SHIPPED | OUTPATIENT
Start: 2018-06-11 | End: 2019-03-06 | Stop reason: SDUPTHER

## 2018-06-11 RX ORDER — SIMVASTATIN 40 MG/1
TABLET, FILM COATED ORAL
Qty: 90 TABLET | Refills: 3 | Status: SHIPPED | OUTPATIENT
Start: 2018-06-11 | End: 2019-03-06 | Stop reason: SDUPTHER

## 2018-06-15 ENCOUNTER — OFFICE VISIT (OUTPATIENT)
Dept: WOUND CARE | Facility: CLINIC | Age: 78
End: 2018-06-15
Payer: MEDICARE

## 2018-06-15 VITALS
BODY MASS INDEX: 28.6 KG/M2 | DIASTOLIC BLOOD PRESSURE: 95 MMHG | SYSTOLIC BLOOD PRESSURE: 169 MMHG | TEMPERATURE: 98 F | WEIGHT: 145.69 LBS | HEIGHT: 60 IN | HEART RATE: 112 BPM

## 2018-06-15 DIAGNOSIS — S71.002A OPEN WOUND OF LEFT HIP, INITIAL ENCOUNTER: Primary | ICD-10-CM

## 2018-06-15 PROBLEM — S51.809A OPEN WOUND OF FOREARM: Status: RESOLVED | Noted: 2018-04-17 | Resolved: 2018-06-15

## 2018-06-15 PROCEDURE — 3077F SYST BP >= 140 MM HG: CPT | Mod: CPTII,S$GLB,, | Performed by: NURSE PRACTITIONER

## 2018-06-15 PROCEDURE — 99211 OFF/OP EST MAY X REQ PHY/QHP: CPT | Mod: S$GLB,,, | Performed by: NURSE PRACTITIONER

## 2018-06-15 PROCEDURE — 99999 PR PBB SHADOW E&M-EST. PATIENT-LVL IV: CPT | Mod: PBBFAC,,, | Performed by: NURSE PRACTITIONER

## 2018-06-15 PROCEDURE — 3080F DIAST BP >= 90 MM HG: CPT | Mod: CPTII,S$GLB,, | Performed by: NURSE PRACTITIONER

## 2018-06-15 NOTE — PROGRESS NOTES
Subjective:       Patient ID: Layla Arellano is a 77 y.o. female.    Chief Complaint: Wound Check    Wound Check        This patient is seen today for reevaluation of wounds to the hip and forearms.  The wound has been there for 3 months.  She is using medihoney gel on the hip wound and the wound is now healed.  She is afebrile.  She is non-communicative and all information is obtained through her daughter.  She does not appear to be in pain when the wound is cleaned.  Review of Systems   Unable to perform ROS: Dementia       Objective:      Physical Exam   Constitutional: She appears well-developed and well-nourished. She appears listless. No distress.   HENT:   Head: Normocephalic and atraumatic.   Cardiovascular: Intact distal pulses.    Musculoskeletal: She exhibits no edema or tenderness.        Legs:  Neurological: She appears listless.   Skin: Skin is warm and dry. No rash noted. She is not diaphoretic. No erythema.   Psychiatric: Her affect is blunt. She is slowed and withdrawn. She is noncommunicative.   Nursing note and vitals reviewed.      Assessment:       1. Open wound of left hip, initial encounter        Plan:            Return to this clinic as needed.    Left lateral hip

## 2018-06-25 ENCOUNTER — PATIENT MESSAGE (OUTPATIENT)
Dept: INTERNAL MEDICINE | Facility: CLINIC | Age: 78
End: 2018-06-25

## 2018-06-25 DIAGNOSIS — F03.91 DEMENTIA WITH BEHAVIORAL DISTURBANCE, UNSPECIFIED DEMENTIA TYPE: ICD-10-CM

## 2018-06-26 ENCOUNTER — PATIENT MESSAGE (OUTPATIENT)
Dept: INTERNAL MEDICINE | Facility: CLINIC | Age: 78
End: 2018-06-26

## 2018-06-26 RX ORDER — DONEPEZIL HYDROCHLORIDE 10 MG/1
TABLET, FILM COATED ORAL
Qty: 90 TABLET | Refills: 3 | Status: SHIPPED | OUTPATIENT
Start: 2018-06-26 | End: 2019-03-26 | Stop reason: SDUPTHER

## 2018-06-26 RX ORDER — QUETIAPINE FUMARATE 50 MG/1
TABLET, FILM COATED ORAL
Qty: 90 TABLET | Refills: 3 | Status: SHIPPED | OUTPATIENT
Start: 2018-06-26 | End: 2019-03-26 | Stop reason: SDUPTHER

## 2018-08-06 ENCOUNTER — TELEPHONE (OUTPATIENT)
Dept: INTERNAL MEDICINE | Facility: CLINIC | Age: 78
End: 2018-08-06

## 2018-08-06 NOTE — TELEPHONE ENCOUNTER
----- Message from Logan Durbin sent at 8/6/2018 11:22 AM CDT -----  Contact: Daughter  11/19/18 Annual Physical need lab orders placed and linked  Thank you

## 2018-08-08 DIAGNOSIS — R73.09 ELEVATED GLUCOSE: ICD-10-CM

## 2018-08-08 DIAGNOSIS — I10 ESSENTIAL HYPERTENSION: Primary | ICD-10-CM

## 2018-08-08 DIAGNOSIS — Z00.00 ANNUAL PHYSICAL EXAM: ICD-10-CM

## 2018-08-08 DIAGNOSIS — E78.5 HYPERLIPIDEMIA, UNSPECIFIED HYPERLIPIDEMIA TYPE: ICD-10-CM

## 2018-08-09 ENCOUNTER — PES CALL (OUTPATIENT)
Dept: ADMINISTRATIVE | Facility: CLINIC | Age: 78
End: 2018-08-09

## 2018-08-29 RX ORDER — ALENDRONATE SODIUM 70 MG/1
TABLET ORAL
Qty: 12 TABLET | Refills: 3 | Status: SHIPPED | OUTPATIENT
Start: 2018-08-29 | End: 2019-07-15

## 2018-08-29 RX ORDER — HYDROCHLOROTHIAZIDE 12.5 MG/1
TABLET ORAL
Qty: 90 TABLET | Refills: 3 | Status: SHIPPED | OUTPATIENT
Start: 2018-08-29 | End: 2019-07-15

## 2018-08-29 RX ORDER — MEMANTINE HYDROCHLORIDE 10 MG/1
TABLET ORAL
Qty: 180 TABLET | Refills: 3 | Status: SHIPPED | OUTPATIENT
Start: 2018-08-29 | End: 2019-07-22 | Stop reason: SDUPTHER

## 2018-09-13 ENCOUNTER — PATIENT MESSAGE (OUTPATIENT)
Dept: INTERNAL MEDICINE | Facility: CLINIC | Age: 78
End: 2018-09-13

## 2018-09-14 ENCOUNTER — TELEPHONE (OUTPATIENT)
Dept: NEUROLOGY | Facility: CLINIC | Age: 78
End: 2018-09-14

## 2018-09-14 ENCOUNTER — PATIENT MESSAGE (OUTPATIENT)
Dept: INTERNAL MEDICINE | Facility: CLINIC | Age: 78
End: 2018-09-14

## 2018-09-14 NOTE — TELEPHONE ENCOUNTER
Patient not eating; ida has to feed her. Lost weight. Daughter adding ensure to diet; patinet not doing much of anything anymore daughter says.  Did communicate with PCP who after advised to add Ensure to diet, called to schedule sooner.  Daughter accepted 9/27 appt. 4:45pm

## 2018-09-14 NOTE — TELEPHONE ENCOUNTER
----- Message from Aicha Arteaga sent at 9/14/2018  8:08 AM CDT -----  Patient Requesting Sooner Appointment.     Reason for sooner appt.:patient is not eating and not doing well  When is the first available appointment?10/17  Communication Preference:Amy (dtr) @ [phone] or  (cell)  Additional Information:

## 2018-09-18 ENCOUNTER — TELEPHONE (OUTPATIENT)
Dept: NEUROLOGY | Facility: CLINIC | Age: 78
End: 2018-09-18

## 2018-09-18 NOTE — TELEPHONE ENCOUNTER
Spoke to patient daughter Amy to advise the appointment for tomorrow is cancelled. Will give to memory staff to reschedule.

## 2018-09-20 ENCOUNTER — OFFICE VISIT (OUTPATIENT)
Dept: NEUROLOGY | Facility: CLINIC | Age: 78
End: 2018-09-20
Payer: MEDICARE

## 2018-09-20 ENCOUNTER — PATIENT MESSAGE (OUTPATIENT)
Dept: NEUROLOGY | Facility: CLINIC | Age: 78
End: 2018-09-20

## 2018-09-20 VITALS
HEIGHT: 60 IN | BODY MASS INDEX: 28.46 KG/M2 | SYSTOLIC BLOOD PRESSURE: 129 MMHG | HEART RATE: 81 BPM | DIASTOLIC BLOOD PRESSURE: 100 MMHG

## 2018-09-20 DIAGNOSIS — R53.1 WEAKNESS: ICD-10-CM

## 2018-09-20 DIAGNOSIS — F03.90 NEURODEGENERATIVE DEMENTIA WITHOUT BEHAVIORAL DISTURBANCE: Primary | ICD-10-CM

## 2018-09-20 PROCEDURE — 3080F DIAST BP >= 90 MM HG: CPT | Mod: CPTII,,, | Performed by: NURSE PRACTITIONER

## 2018-09-20 PROCEDURE — 99214 OFFICE O/P EST MOD 30 MIN: CPT | Mod: S$PBB,,, | Performed by: NURSE PRACTITIONER

## 2018-09-20 PROCEDURE — 99999 PR PBB SHADOW E&M-EST. PATIENT-LVL II: CPT | Mod: PBBFAC,,, | Performed by: NURSE PRACTITIONER

## 2018-09-20 PROCEDURE — 3074F SYST BP LT 130 MM HG: CPT | Mod: CPTII,,, | Performed by: NURSE PRACTITIONER

## 2018-09-20 PROCEDURE — 1101F PT FALLS ASSESS-DOCD LE1/YR: CPT | Mod: CPTII,,, | Performed by: NURSE PRACTITIONER

## 2018-09-20 PROCEDURE — 99212 OFFICE O/P EST SF 10 MIN: CPT | Mod: PBBFAC | Performed by: NURSE PRACTITIONER

## 2018-09-20 NOTE — LETTER
September 20, 2018      Kevin Han MD  [address]  [facility] [address]           [facility]  Neurology  [address]  [facility] [address]  Phone: [phone]  Fax: [phone]          Patient: Layla Arellano   MR Number: [id number]   YOB: 1940   Date of Visit: 9/20/2018       Dear Dr. Kevin Han:    Thank you for referring Layla Arellano to me for evaluation. Attached you will find relevant portions of my assessment and plan of care.    If you have questions, please do not hesitate to call me. I look forward to following Layla Arellano along with you.    Sincerely,    Vi Lindquist, BRAIN    Enclosure  CC:  No Recipients    If you would like to receive this communication electronically, please contact [email or web address] or [phone] to request more information on [facility] Link access.    For providers and/or their staff who would like to refer a patient to [facility], please contact us through our one-stop-shop provider referral line, [facility], at [phone].    If you feel you have received this communication in error or would no longer like to receive these types of communications, please e-mail [email or web address]

## 2018-09-20 NOTE — PROGRESS NOTES
Name: Layla Arellano  MRN: [id number]   CSN: [id number]      Date: 9-20-18      Referring physician:  Kevin Han MD  [address] [place] [address]    Subjective:      Chief Complaint: declining- refusing to eat    History of Present Illness (HPI):    Layla Arellano is a 78 y.o. left-handed female who presents today for a follow-up evaluation of Dementia and is accompanied by daughter. Last seen in office 2-25-18.     She has been steadily declining since last visit. In August, she stopped feeding herself.   Last week, she dropped down to 2 meals/day.   This week, she is down to 1 meal / day with 1 Ensure.   Today, she has eaten about 8 bites.   When daughter tries to feed her, she will lock her jaw.  She still drinks 1 Ensure and will drink water and milk.  Refused to take meds most of the time so daughter smashes and mixes in milk. She will take this way.    She is picking at her skin.        Coloring was her hobby. She stopped this about 3-4 weeks ago.     Dtr continues to bathe her but she does put up a fight.   Does not want to brush teeth.   Will pull pants up after she has used restroom but will not wipe- relies on dtr.   She will walk to the bathroom.     Sleeps all night. Dozes off and on during the day. Most of the time she will sit quietly. No interest in TV. Does like when daughter plays music.        Review of Systems   Unable to perform ROS: Dementia       Past Medical History: The patient  has a past medical history of Alzheimer's disease, Arthritis, CKD (chronic kidney disease) stage 3, GFR 30-59 ml/min (6/12/2015), Dementia, Dementia, Glaucoma, Heart murmur, Hyperlipidemia, Hypertension, Macular degeneration - Both Eyes (5/10/2013), Obesity (8/15/2017), and Osteoporosis (5/3/2016).    Social History: The patient  reports that  has never smoked. she has never used smokeless tobacco. She reports that she does not drink alcohol or use drugs.    Family History: Their family history includes  Cancer (age of onset: 70) in her mother; Glaucoma in her maternal grandmother; Heart attack (age of onset: 50) in her father; Heart disease in her daughter; Stroke in her mother.    Allergies: Patient has no known allergies.     Meds:   Current Outpatient Medications on File Prior to Visit   Medication Sig Dispense Refill    alendronate (FOSAMAX) 70 MG tablet TAKE 1 TABLET EVERY 7 DAYS. SEE PACKAGE FOR ADDITIONAL INSTRUCTIONS 12 tablet 3    benazepril (LOTENSIN) 5 MG tablet TAKE 1 TABLET (5 MG TOTAL) BY MOUTH EVERY EVENING. 90 tablet 3    bimatoprost (LUMIGAN) 0.01 % Drop Place 1 drop into both eyes once daily. 2.5 mL 12    calcium carbonate (OS-MARIO) 500 mg calcium (1,250 mg) tablet Take 1 tablet by mouth once daily.      donepezil (ARICEPT) 10 MG tablet TAKE 1 TABLET EVERY DAY 90 tablet 3    hydroCHLOROthiazide (HYDRODIURIL) 12.5 MG Tab TAKE 1 TABLET EVERY DAY 90 tablet 3    memantine (NAMENDA) 10 MG Tab TAKE 1 TABLET TWICE DAILY 180 tablet 3    NYSTATIN (DUKE'S SOLUTION) Take 10 mLs by mouth 4 (four) times daily. 30 ml benedryl, 30ml nystatin, 30ml lidocaine, 30ml mylanta 120 mL 0    QUEtiapine (SEROQUEL) 25 MG Tab TAKE 1 TABLET (25 MG TOTAL) BY MOUTH 4 (FOUR) TIMES DAILY BEFORE MEALS AND NIGHTLY. 360 tablet 3    QUEtiapine (SEROQUEL) 50 MG tablet TAKE 1 TABLET EVERY EVENING 90 tablet 3    simvastatin (ZOCOR) 40 MG tablet TAKE 1 TABLET EVERY EVENING 90 tablet 3    timolol maleate 0.5% (TIMOPTIC) 0.5 % Drop INSTILL 1 DROP INTO BOTH EYES EVERY MORNING 15 mL 3    vitamin D 1000 units Tab Take 185 mg by mouth every evening.       BETA-CAROTENE,A, W-C & E/MIN (OCUVITE ORAL) Take by mouth once daily.       nystatin-triamcinolone (MYCOLOG) ointment APPLY TOPICALLY TO AFFECTED AREA 2 (TWO) TIMES DAILY. 30 g 1    nystatin-triamcinolone (MYCOLOG) ointment APPLY TOPICALLY TO AFFECTED AREA 2 (TWO) TIMES DAILY. 30 g 2     No current facility-administered medications on file prior to visit.        Objective:      Physical Exam:    Vitals:    09/20/18 1614   BP: (!) 129/100   Pulse: 81   Height: 5' (1.524 m)     Body mass index is 28.46 kg/m².    Constitutional  Well-developed, well-nourished, appears stated age     Sitting in WC. Most of the time, rests head on hand. Will occasionally look over to examiner but then quickly looks away.   Non-verbal today.   Does not participate in any way.     Laboratory Results:  Lab Visit on 09/20/2018   Component Date Value Ref Range Status    WBC 09/20/2018 14.81* 3.90 - 12.70 K/uL Final    RBC 09/20/2018 5.39  4.00 - 5.40 M/uL Final    Hemoglobin 09/20/2018 16.2* 12.0 - 16.0 g/dL Final    Hematocrit 09/20/2018 48.5  37.0 - 48.5 % Final    MCV 09/20/2018 90  82 - 98 fL Final    MCH 09/20/2018 30.1  27.0 - 31.0 pg Final    MCHC 09/20/2018 33.4  32.0 - 36.0 g/dL Final    RDW 09/20/2018 14.1  11.5 - 14.5 % Final    Platelets 09/20/2018 250  150 - 350 K/uL Final    MPV 09/20/2018 12.6  9.2 - 12.9 fL Final    Immature Granulocytes 09/20/2018 0.4  0.0 - 0.5 % Final    Gran # (ANC) 09/20/2018 9.4* 1.8 - 7.7 K/uL Final    Immature Grans (Abs) 09/20/2018 0.06* 0.00 - 0.04 K/uL Final    Lymph # 09/20/2018 3.7  1.0 - 4.8 K/uL Final    Mono # 09/20/2018 1.1* 0.3 - 1.0 K/uL Final    Eos # 09/20/2018 0.4  0.0 - 0.5 K/uL Final    Baso # 09/20/2018 0.08  0.00 - 0.20 K/uL Final    nRBC 09/20/2018 0  0 /100 WBC Final    Gran% 09/20/2018 63.8  38.0 - 73.0 % Final    Lymph% 09/20/2018 25.2  18.0 - 48.0 % Final    Mono% 09/20/2018 7.6  4.0 - 15.0 % Final    Eosinophil% 09/20/2018 2.5  0.0 - 8.0 % Final    Basophil% 09/20/2018 0.5  0.0 - 1.9 % Final    Differential Method 09/20/2018 Automated   Final       Imaging:   From 1-2015                  Assessment and Plan     Neurodegenerative dementia without behavioral disturbance  Comments:  Probable Alzheimer's, late stage    Weakness  -     CBC auto differential; Future; Expected date: 09/20/2018  -     Urinalysis; Future; Expected  date: 09/21/2018        Medical Decision Making:    Discussed feeding tube. Daughter says she absolutely does not want this. I agree.   Discussed offering milkshakes and even adding protein powder.   Okay to increase Ensure-4+ if she will drink is fine.   Continue hydration as long as she is willing.  Offer food but not to frustration.     Discussed checking to see if she has infection/UTI. She would like to pursue this. She will go to lab now and have CBC.  Sent home with collection hat and specimen cup.     Discussed late stage dementia. Refusing to eat is expected with disease progression.   Discussed option of Hospice. She would like to pursue and at least talk to them. She requests [facility].   Referral called in and order sent.       I spent 25 minutes face-to-face with the patient and daughter with >100% of the time spent with counseling and education regarding:  - results of data, diagnosis, and recommendations stated above  - the prognosis of dementia  - rationale of above plan      Vi Lindquist DNP, NP-C  Division of Movement and Memory Disorders  [facility]  [phone]

## 2018-09-20 NOTE — TELEPHONE ENCOUNTER
Returned call to patient daughter Amy; informed her that she does have an appointment with april for today at 4:45.  Advised daughter that when I last spoke with her I scheduled her for 9\27, and added to wait list, also advised that if patient situation became emergent to seek care at emergency department.  Daughter tried to get an appointment with a resident staff , but was cancelled since patient actively treated by April.

## 2018-09-21 ENCOUNTER — LAB VISIT (OUTPATIENT)
Dept: LAB | Facility: HOSPITAL | Age: 78
End: 2018-09-21
Attending: INTERNAL MEDICINE
Payer: MEDICARE

## 2018-09-21 DIAGNOSIS — R53.1 WEAKNESS: ICD-10-CM

## 2018-09-21 LAB
BACTERIA #/AREA URNS AUTO: ABNORMAL /HPF
BILIRUB UR QL STRIP: NEGATIVE
CLARITY UR REFRACT.AUTO: ABNORMAL
COLOR UR AUTO: YELLOW
GLUCOSE UR QL STRIP: NEGATIVE
HGB UR QL STRIP: NEGATIVE
KETONES UR QL STRIP: NEGATIVE
LEUKOCYTE ESTERASE UR QL STRIP: ABNORMAL
MICROSCOPIC COMMENT: ABNORMAL
NITRITE UR QL STRIP: NEGATIVE
PH UR STRIP: 6 [PH] (ref 5–8)
PROT UR QL STRIP: NEGATIVE
RBC #/AREA URNS AUTO: 3 /HPF (ref 0–4)
SP GR UR STRIP: 1.01 (ref 1–1.03)
SQUAMOUS #/AREA URNS AUTO: 3 /HPF
URN SPEC COLLECT METH UR: ABNORMAL
UROBILINOGEN UR STRIP-ACNC: NEGATIVE EU/DL
WBC #/AREA URNS AUTO: 24 /HPF (ref 0–5)

## 2018-09-21 PROCEDURE — 81001 URINALYSIS AUTO W/SCOPE: CPT

## 2018-11-09 ENCOUNTER — PES CALL (OUTPATIENT)
Dept: ADMINISTRATIVE | Facility: CLINIC | Age: 78
End: 2018-11-09

## 2019-01-23 RX ORDER — BENAZEPRIL HYDROCHLORIDE 5 MG/1
5 TABLET ORAL NIGHTLY
Qty: 90 TABLET | Refills: 3 | Status: SHIPPED | OUTPATIENT
Start: 2019-01-23 | End: 2019-12-18 | Stop reason: SDUPTHER

## 2019-01-29 ENCOUNTER — PES CALL (OUTPATIENT)
Dept: ADMINISTRATIVE | Facility: CLINIC | Age: 79
End: 2019-01-29

## 2019-03-06 DIAGNOSIS — E78.5 HYPERLIPIDEMIA, UNSPECIFIED HYPERLIPIDEMIA TYPE: ICD-10-CM

## 2019-03-06 RX ORDER — SIMVASTATIN 40 MG/1
TABLET, FILM COATED ORAL
Qty: 90 TABLET | Refills: 3 | Status: SHIPPED | OUTPATIENT
Start: 2019-03-06

## 2019-03-06 RX ORDER — TIMOLOL MALEATE 5 MG/ML
SOLUTION/ DROPS OPHTHALMIC
Qty: 15 ML | Refills: 3 | Status: SHIPPED | OUTPATIENT
Start: 2019-03-06 | End: 2019-12-02 | Stop reason: SDUPTHER

## 2019-03-26 DIAGNOSIS — F03.91 DEMENTIA WITH BEHAVIORAL DISTURBANCE, UNSPECIFIED DEMENTIA TYPE: ICD-10-CM

## 2019-03-27 RX ORDER — QUETIAPINE FUMARATE 50 MG/1
TABLET, FILM COATED ORAL
Qty: 90 TABLET | Refills: 3 | Status: SHIPPED | OUTPATIENT
Start: 2019-03-27 | End: 2019-06-25 | Stop reason: SDUPTHER

## 2019-03-27 RX ORDER — DONEPEZIL HYDROCHLORIDE 10 MG/1
TABLET, FILM COATED ORAL
Qty: 90 TABLET | Refills: 3 | Status: SHIPPED | OUTPATIENT
Start: 2019-03-27 | End: 2019-12-26

## 2019-04-04 DIAGNOSIS — L30.4 INTERTRIGO: ICD-10-CM

## 2019-04-04 RX ORDER — NYSTATIN AND TRIAMCINOLONE ACETONIDE 100000; 1 [USP'U]/G; MG/G
OINTMENT TOPICAL
Qty: 30 G | Refills: 0 | Status: ON HOLD | OUTPATIENT
Start: 2019-04-04 | End: 2019-06-30 | Stop reason: HOSPADM

## 2019-05-09 ENCOUNTER — TELEPHONE (OUTPATIENT)
Dept: INTERNAL MEDICINE | Facility: CLINIC | Age: 79
End: 2019-05-09

## 2019-05-09 NOTE — TELEPHONE ENCOUNTER
----- Message from Trip Otto sent at 5/9/2019 11:17 AM CDT -----  Contact: [phone]  Type: Sooner appointment than  is able to schedule    When is the first available appointment? 07/19    What is the nature of the appointment? Epp     What appointment type: physical     Comments: please call and advise thanks

## 2019-05-20 DIAGNOSIS — H40.89 GLAUCOMA DUE TO COMBINATION OF MECHANISMS: ICD-10-CM

## 2019-05-21 ENCOUNTER — TELEPHONE (OUTPATIENT)
Dept: OPHTHALMOLOGY | Facility: CLINIC | Age: 79
End: 2019-05-21

## 2019-05-29 ENCOUNTER — TELEPHONE (OUTPATIENT)
Dept: INTERNAL MEDICINE | Facility: CLINIC | Age: 79
End: 2019-05-29

## 2019-05-29 DIAGNOSIS — G30.9 ALZHEIMER'S DEMENTIA WITH BEHAVIORAL DISTURBANCE, UNSPECIFIED TIMING OF DEMENTIA ONSET: Primary | ICD-10-CM

## 2019-05-29 DIAGNOSIS — F02.81 ALZHEIMER'S DEMENTIA WITH BEHAVIORAL DISTURBANCE, UNSPECIFIED TIMING OF DEMENTIA ONSET: Primary | ICD-10-CM

## 2019-05-29 DIAGNOSIS — M17.0 PRIMARY OSTEOARTHRITIS OF BOTH KNEES: ICD-10-CM

## 2019-05-29 NOTE — TELEPHONE ENCOUNTER
----- Message from Jane Sheridan sent at 5/29/2019 11:18 AM CDT -----  Contact: Sangeetha/[facility]/[phone]  [facility] is requesting a transport wheel chair for the patient.    Please advise, thank you

## 2019-06-12 ENCOUNTER — TELEPHONE (OUTPATIENT)
Dept: INTERNAL MEDICINE | Facility: CLINIC | Age: 79
End: 2019-06-12

## 2019-06-12 NOTE — TELEPHONE ENCOUNTER
On 05/29/2019 [facility] request a transport wheelchair but an order was put in for a WALKER FOR HOME USE     On today a request came in for a U/A for a possible UTI.    Orders for a wheelchair and U/A are needed.  Please advise,  Thank You.

## 2019-06-12 NOTE — TELEPHONE ENCOUNTER
----- Message from Vera Almanza sent at 6/12/2019  9:56 AM CDT -----  Contact: Amy/Care taker/[phone] or [phone]  Amy called in regards needing to talk with Dr Han about the approval for the urine test and the wheelchair request. Would like to know the outcome. Please call and advise. Thank you!!!

## 2019-06-12 NOTE — TELEPHONE ENCOUNTER
----- Message from Vandana Porter sent at 6/12/2019  9:04 AM CDT -----  Contact: Jazmyne/[facility] [phone]  Possible UTI. Requesting orders to test urine in the home.    Please call and advise.    Thank You

## 2019-06-13 ENCOUNTER — TELEPHONE (OUTPATIENT)
Dept: INTERNAL MEDICINE | Facility: CLINIC | Age: 79
End: 2019-06-13

## 2019-06-13 NOTE — TELEPHONE ENCOUNTER
----- Message from Logan Durbin sent at 6/13/2019 10:10 AM CDT -----  Contact: Daughter Mya [phone] Cell [phone] Cell  Patient daughter calling regarding update to previous message that was sent.    Daughter Mya [phone] Cell [phone] Cell    Please call an advise  Thank you

## 2019-06-15 DIAGNOSIS — G30.9 ALZHEIMER'S DEMENTIA WITH BEHAVIORAL DISTURBANCE, UNSPECIFIED TIMING OF DEMENTIA ONSET: Primary | ICD-10-CM

## 2019-06-15 DIAGNOSIS — N39.0 ACUTE UTI: ICD-10-CM

## 2019-06-15 DIAGNOSIS — F02.81 ALZHEIMER'S DEMENTIA WITH BEHAVIORAL DISTURBANCE, UNSPECIFIED TIMING OF DEMENTIA ONSET: Primary | ICD-10-CM

## 2019-06-15 DIAGNOSIS — M17.0 PRIMARY OSTEOARTHRITIS OF BOTH KNEES: ICD-10-CM

## 2019-06-19 ENCOUNTER — TELEPHONE (OUTPATIENT)
Dept: INTERNAL MEDICINE | Facility: CLINIC | Age: 79
End: 2019-06-19

## 2019-06-19 NOTE — TELEPHONE ENCOUNTER
----- Message from Lucio Landis sent at 6/19/2019 10:38 AM CDT -----  Contact: Daughter Amy [phone]  She spoke to [facility] and they said need the orders faxed to them for the patient to have a urinalysis. The phone # is [phone] and fax# [phone].    Thank you

## 2019-06-19 NOTE — TELEPHONE ENCOUNTER
----- Message from Jane Sheridan sent at 6/19/2019 10:16 AM CDT -----  Contact: [phone]/ Suraj/[facility]/or fax [phone]  Suraj/[facility] was calling to verify that the doctor order a urinalysis for patient on Saturday 6-.  Please fax order over to [facility] [phone].    Please advise, thank you

## 2019-06-21 ENCOUNTER — TELEPHONE (OUTPATIENT)
Dept: INTERNAL MEDICINE | Facility: CLINIC | Age: 79
End: 2019-06-21

## 2019-06-21 NOTE — TELEPHONE ENCOUNTER
----- Message from Joanna Michael sent at 6/21/2019  9:18 AM CDT -----  Contact: [facility] [phone]  [facility] is requesting patients recent clinical notes for patient for patient to get a wheel chair.    FAX # [phone]    Please advise, thanks

## 2019-06-25 DIAGNOSIS — F03.91 DEMENTIA WITH BEHAVIORAL DISTURBANCE, UNSPECIFIED DEMENTIA TYPE: ICD-10-CM

## 2019-06-25 RX ORDER — QUETIAPINE FUMARATE 50 MG/1
50 TABLET, FILM COATED ORAL NIGHTLY
Qty: 90 TABLET | Refills: 3 | Status: ON HOLD | OUTPATIENT
Start: 2019-06-25 | End: 2019-06-28 | Stop reason: CLARIF

## 2019-06-25 NOTE — TELEPHONE ENCOUNTER
----- Message from Lucio Landis sent at 6/25/2019 10:17 AM CDT -----  Contact: DaughterAmy [phone]  Is this a refill or new RX:  Refill    RX name and strength: QUEtiapine (SEROQUEL) 25 MG Tab      Pharmacy name and phone # [id number] - [place] [address] [phone] (Phone) [phone] (Fax)

## 2019-06-27 ENCOUNTER — HOSPITAL ENCOUNTER (INPATIENT)
Facility: HOSPITAL | Age: 79
LOS: 3 days | Discharge: HOME HEALTH | DRG: 872 | End: 2019-06-30
Attending: EMERGENCY MEDICINE | Admitting: EMERGENCY MEDICINE
Payer: MEDICARE

## 2019-06-27 ENCOUNTER — OFFICE VISIT (OUTPATIENT)
Dept: INTERNAL MEDICINE | Facility: CLINIC | Age: 79
End: 2019-06-27
Payer: MEDICARE

## 2019-06-27 VITALS
TEMPERATURE: 98 F | HEIGHT: 60 IN | BODY MASS INDEX: 28.66 KG/M2 | SYSTOLIC BLOOD PRESSURE: 128 MMHG | WEIGHT: 146 LBS | DIASTOLIC BLOOD PRESSURE: 93 MMHG | HEART RATE: 112 BPM

## 2019-06-27 DIAGNOSIS — L02.91 ABSCESS: Primary | ICD-10-CM

## 2019-06-27 DIAGNOSIS — R79.89 LACTATE BLOOD INCREASE: ICD-10-CM

## 2019-06-27 DIAGNOSIS — I10 ESSENTIAL HYPERTENSION: ICD-10-CM

## 2019-06-27 DIAGNOSIS — F03.90 NEURODEGENERATIVE DEMENTIA WITHOUT BEHAVIORAL DISTURBANCE: ICD-10-CM

## 2019-06-27 DIAGNOSIS — F03.90 DEMENTIA WITHOUT BEHAVIORAL DISTURBANCE, UNSPECIFIED DEMENTIA TYPE: ICD-10-CM

## 2019-06-27 DIAGNOSIS — L03.211 FACIAL CELLULITIS: Primary | ICD-10-CM

## 2019-06-27 DIAGNOSIS — L03.211 FACIAL CELLULITIS: ICD-10-CM

## 2019-06-27 DIAGNOSIS — D72.829 LEUKOCYTOSIS, UNSPECIFIED TYPE: ICD-10-CM

## 2019-06-27 DIAGNOSIS — N18.30 CKD (CHRONIC KIDNEY DISEASE) STAGE 3, GFR 30-59 ML/MIN: ICD-10-CM

## 2019-06-27 DIAGNOSIS — A41.9 SEPSIS, DUE TO UNSPECIFIED ORGANISM: ICD-10-CM

## 2019-06-27 LAB
ALBUMIN SERPL BCP-MCNC: 3.2 G/DL (ref 3.5–5.2)
ALP SERPL-CCNC: 93 U/L (ref 55–135)
ALT SERPL W/O P-5'-P-CCNC: 19 U/L (ref 10–44)
ANION GAP SERPL CALC-SCNC: 8 MMOL/L (ref 8–16)
AST SERPL-CCNC: 16 U/L (ref 10–40)
BASOPHILS # BLD AUTO: 0.08 K/UL (ref 0–0.2)
BASOPHILS NFR BLD: 0.4 % (ref 0–1.9)
BILIRUB SERPL-MCNC: 0.4 MG/DL (ref 0.1–1)
BUN SERPL-MCNC: 19 MG/DL (ref 8–23)
CALCIUM SERPL-MCNC: 10.4 MG/DL (ref 8.7–10.5)
CHLORIDE SERPL-SCNC: 99 MMOL/L (ref 95–110)
CO2 SERPL-SCNC: 32 MMOL/L (ref 23–29)
CREAT SERPL-MCNC: 1 MG/DL (ref 0.5–1.4)
CRP SERPL-MCNC: 153.8 MG/L (ref 0–8.2)
DIFFERENTIAL METHOD: ABNORMAL
EOSINOPHIL # BLD AUTO: 0.1 K/UL (ref 0–0.5)
EOSINOPHIL NFR BLD: 0.6 % (ref 0–8)
ERYTHROCYTE [DISTWIDTH] IN BLOOD BY AUTOMATED COUNT: 14.1 % (ref 11.5–14.5)
ERYTHROCYTE [SEDIMENTATION RATE] IN BLOOD BY WESTERGREN METHOD: 46 MM/HR (ref 0–36)
EST. GFR  (AFRICAN AMERICAN): >60 ML/MIN/1.73 M^2
EST. GFR  (NON AFRICAN AMERICAN): 53.7 ML/MIN/1.73 M^2
GLUCOSE SERPL-MCNC: 175 MG/DL (ref 70–110)
HCT VFR BLD AUTO: 44.4 % (ref 37–48.5)
HGB BLD-MCNC: 14.3 G/DL (ref 12–16)
IMM GRANULOCYTES # BLD AUTO: 0.1 K/UL (ref 0–0.04)
IMM GRANULOCYTES NFR BLD AUTO: 0.5 % (ref 0–0.5)
LACTATE SERPL-SCNC: 2.6 MMOL/L (ref 0.5–2.2)
LYMPHOCYTES # BLD AUTO: 2.4 K/UL (ref 1–4.8)
LYMPHOCYTES NFR BLD: 12.8 % (ref 18–48)
MAGNESIUM SERPL-MCNC: 2.5 MG/DL (ref 1.6–2.6)
MCH RBC QN AUTO: 30.3 PG (ref 27–31)
MCHC RBC AUTO-ENTMCNC: 32.2 G/DL (ref 32–36)
MCV RBC AUTO: 94 FL (ref 82–98)
MONOCYTES # BLD AUTO: 2 K/UL (ref 0.3–1)
MONOCYTES NFR BLD: 10.5 % (ref 4–15)
NEUTROPHILS # BLD AUTO: 14.3 K/UL (ref 1.8–7.7)
NEUTROPHILS NFR BLD: 75.2 % (ref 38–73)
NRBC BLD-RTO: 0 /100 WBC
PLATELET # BLD AUTO: 212 K/UL (ref 150–350)
PMV BLD AUTO: 13.4 FL (ref 9.2–12.9)
POTASSIUM SERPL-SCNC: 3.8 MMOL/L (ref 3.5–5.1)
PROT SERPL-MCNC: 7.3 G/DL (ref 6–8.4)
RBC # BLD AUTO: 4.72 M/UL (ref 4–5.4)
SODIUM SERPL-SCNC: 139 MMOL/L (ref 136–145)
WBC # BLD AUTO: 19.03 K/UL (ref 3.9–12.7)

## 2019-06-27 PROCEDURE — 25000003 PHARM REV CODE 250: Mod: HCNC | Performed by: INTERNAL MEDICINE

## 2019-06-27 PROCEDURE — 3074F SYST BP LT 130 MM HG: CPT | Mod: HCNC,CPTII,S$GLB, | Performed by: PHYSICIAN ASSISTANT

## 2019-06-27 PROCEDURE — 63600175 PHARM REV CODE 636 W HCPCS: Mod: HCNC | Performed by: PHYSICIAN ASSISTANT

## 2019-06-27 PROCEDURE — 87077 CULTURE AEROBIC IDENTIFY: CPT | Mod: HCNC

## 2019-06-27 PROCEDURE — 96365 THER/PROPH/DIAG IV INF INIT: CPT | Mod: HCNC

## 2019-06-27 PROCEDURE — 87186 SC STD MICRODIL/AGAR DIL: CPT | Mod: HCNC

## 2019-06-27 PROCEDURE — 1101F PT FALLS ASSESS-DOCD LE1/YR: CPT | Mod: HCNC,CPTII,S$GLB, | Performed by: PHYSICIAN ASSISTANT

## 2019-06-27 PROCEDURE — 3078F PR MOST RECENT DIASTOLIC BLOOD PRESSURE < 80 MM HG: ICD-10-PCS | Mod: HCNC,CPTII,S$GLB, | Performed by: PHYSICIAN ASSISTANT

## 2019-06-27 PROCEDURE — 12000002 HC ACUTE/MED SURGE SEMI-PRIVATE ROOM: Mod: HCNC

## 2019-06-27 PROCEDURE — 3078F DIAST BP <80 MM HG: CPT | Mod: HCNC,CPTII,S$GLB, | Performed by: PHYSICIAN ASSISTANT

## 2019-06-27 PROCEDURE — 25500020 PHARM REV CODE 255: Mod: HCNC | Performed by: EMERGENCY MEDICINE

## 2019-06-27 PROCEDURE — 10060 I&D ABSCESS SIMPLE/SINGLE: CPT | Mod: ,,, | Performed by: PHYSICIAN ASSISTANT

## 2019-06-27 PROCEDURE — 99291 CRITICAL CARE FIRST HOUR: CPT | Mod: HCNC,25,, | Performed by: EMERGENCY MEDICINE

## 2019-06-27 PROCEDURE — 96361 HYDRATE IV INFUSION ADD-ON: CPT | Mod: HCNC

## 2019-06-27 PROCEDURE — 80053 COMPREHEN METABOLIC PANEL: CPT | Mod: HCNC

## 2019-06-27 PROCEDURE — 1101F PR PT FALLS ASSESS DOC 0-1 FALLS W/OUT INJ PAST YR: ICD-10-PCS | Mod: HCNC,CPTII,S$GLB, | Performed by: PHYSICIAN ASSISTANT

## 2019-06-27 PROCEDURE — 99999 PR PBB SHADOW E&M-EST. PATIENT-LVL IV: ICD-10-PCS | Mod: PBBFAC,HCNC,, | Performed by: PHYSICIAN ASSISTANT

## 2019-06-27 PROCEDURE — 85025 COMPLETE CBC W/AUTO DIFF WBC: CPT | Mod: HCNC

## 2019-06-27 PROCEDURE — 25000003 PHARM REV CODE 250: Mod: HCNC | Performed by: PHYSICIAN ASSISTANT

## 2019-06-27 PROCEDURE — 96372 THER/PROPH/DIAG INJ SC/IM: CPT | Mod: 59,HCNC

## 2019-06-27 PROCEDURE — 83605 ASSAY OF LACTIC ACID: CPT | Mod: HCNC

## 2019-06-27 PROCEDURE — 86140 C-REACTIVE PROTEIN: CPT | Mod: HCNC

## 2019-06-27 PROCEDURE — 87070 CULTURE OTHR SPECIMN AEROBIC: CPT | Mod: HCNC

## 2019-06-27 PROCEDURE — S0077 INJECTION, CLINDAMYCIN PHOSP: HCPCS | Mod: HCNC | Performed by: PHYSICIAN ASSISTANT

## 2019-06-27 PROCEDURE — 10060 I&D ABSCESS SIMPLE/SINGLE: CPT | Mod: HCNC

## 2019-06-27 PROCEDURE — 83735 ASSAY OF MAGNESIUM: CPT | Mod: HCNC

## 2019-06-27 PROCEDURE — 99499 UNLISTED E&M SERVICE: CPT | Mod: S$GLB,,, | Performed by: PHYSICIAN ASSISTANT

## 2019-06-27 PROCEDURE — 87040 BLOOD CULTURE FOR BACTERIA: CPT | Mod: HCNC

## 2019-06-27 PROCEDURE — 99499 RISK ADDL DX/OHS AUDIT: ICD-10-PCS | Mod: S$GLB,,, | Performed by: PHYSICIAN ASSISTANT

## 2019-06-27 PROCEDURE — 99214 OFFICE O/P EST MOD 30 MIN: CPT | Mod: HCNC,S$GLB,, | Performed by: PHYSICIAN ASSISTANT

## 2019-06-27 PROCEDURE — 85652 RBC SED RATE AUTOMATED: CPT | Mod: HCNC

## 2019-06-27 PROCEDURE — 99291 PR CRITICAL CARE, E/M 30-74 MINUTES: ICD-10-PCS | Mod: HCNC,25,, | Performed by: EMERGENCY MEDICINE

## 2019-06-27 PROCEDURE — 99999 PR PBB SHADOW E&M-EST. PATIENT-LVL IV: CPT | Mod: PBBFAC,HCNC,, | Performed by: PHYSICIAN ASSISTANT

## 2019-06-27 PROCEDURE — 99291 CRITICAL CARE FIRST HOUR: CPT | Mod: 25,HCNC

## 2019-06-27 PROCEDURE — 63600175 PHARM REV CODE 636 W HCPCS: Mod: HCNC | Performed by: INTERNAL MEDICINE

## 2019-06-27 PROCEDURE — 99214 PR OFFICE/OUTPT VISIT, EST, LEVL IV, 30-39 MIN: ICD-10-PCS | Mod: HCNC,S$GLB,, | Performed by: PHYSICIAN ASSISTANT

## 2019-06-27 PROCEDURE — 10060 PR DRAIN SKIN ABSCESS SIMPLE: ICD-10-PCS | Mod: ,,, | Performed by: PHYSICIAN ASSISTANT

## 2019-06-27 PROCEDURE — 63600175 PHARM REV CODE 636 W HCPCS: Mod: HCNC | Performed by: EMERGENCY MEDICINE

## 2019-06-27 PROCEDURE — 3074F PR MOST RECENT SYSTOLIC BLOOD PRESSURE < 130 MM HG: ICD-10-PCS | Mod: HCNC,CPTII,S$GLB, | Performed by: PHYSICIAN ASSISTANT

## 2019-06-27 RX ORDER — OXYCODONE HYDROCHLORIDE 5 MG/1
5 TABLET ORAL EVERY 6 HOURS PRN
Status: DISCONTINUED | OUTPATIENT
Start: 2019-06-27 | End: 2019-06-30 | Stop reason: HOSPADM

## 2019-06-27 RX ORDER — ONDANSETRON 8 MG/1
8 TABLET, ORALLY DISINTEGRATING ORAL EVERY 8 HOURS PRN
Status: DISCONTINUED | OUTPATIENT
Start: 2019-06-27 | End: 2019-06-30 | Stop reason: HOSPADM

## 2019-06-27 RX ORDER — ACETAMINOPHEN 325 MG/1
650 TABLET ORAL EVERY 4 HOURS PRN
Status: DISCONTINUED | OUTPATIENT
Start: 2019-06-27 | End: 2019-06-30 | Stop reason: HOSPADM

## 2019-06-27 RX ORDER — PROMETHAZINE HYDROCHLORIDE 25 MG/1
25 TABLET ORAL EVERY 6 HOURS PRN
Status: DISCONTINUED | OUTPATIENT
Start: 2019-06-27 | End: 2019-06-30 | Stop reason: HOSPADM

## 2019-06-27 RX ORDER — DONEPEZIL HYDROCHLORIDE 10 MG/1
10 TABLET, FILM COATED ORAL DAILY
Status: DISCONTINUED | OUTPATIENT
Start: 2019-06-28 | End: 2019-06-30 | Stop reason: HOSPADM

## 2019-06-27 RX ORDER — MICONAZOLE NITRATE 2 %
POWDER (GRAM) TOPICAL 2 TIMES DAILY
Status: DISCONTINUED | OUTPATIENT
Start: 2019-06-28 | End: 2019-06-30 | Stop reason: HOSPADM

## 2019-06-27 RX ORDER — SODIUM CHLORIDE 0.9 % (FLUSH) 0.9 %
10 SYRINGE (ML) INJECTION
Status: DISCONTINUED | OUTPATIENT
Start: 2019-06-27 | End: 2019-06-30 | Stop reason: HOSPADM

## 2019-06-27 RX ORDER — FLUCONAZOLE 40 MG/ML
100 POWDER, FOR SUSPENSION ORAL DAILY
Status: DISCONTINUED | OUTPATIENT
Start: 2019-06-28 | End: 2019-06-28

## 2019-06-27 RX ORDER — LACTULOSE 10 G/10G
10 SOLUTION ORAL 3 TIMES DAILY
Status: ON HOLD | COMMUNITY
End: 2019-06-30 | Stop reason: HOSPADM

## 2019-06-27 RX ORDER — AMOXICILLIN 250 MG
1 CAPSULE ORAL 2 TIMES DAILY
Status: DISCONTINUED | OUTPATIENT
Start: 2019-06-27 | End: 2019-06-30 | Stop reason: HOSPADM

## 2019-06-27 RX ORDER — SIMVASTATIN 40 MG/1
40 TABLET, FILM COATED ORAL NIGHTLY
Status: DISCONTINUED | OUTPATIENT
Start: 2019-06-27 | End: 2019-06-30 | Stop reason: HOSPADM

## 2019-06-27 RX ORDER — SODIUM CHLORIDE 0.9 % (FLUSH) 0.9 %
10 SYRINGE (ML) INJECTION
Status: DISCONTINUED | OUTPATIENT
Start: 2019-06-27 | End: 2019-06-27

## 2019-06-27 RX ORDER — LORAZEPAM 2 MG/ML
1 INJECTION INTRAMUSCULAR
Status: COMPLETED | OUTPATIENT
Start: 2019-06-27 | End: 2019-06-27

## 2019-06-27 RX ORDER — HYDROCHLOROTHIAZIDE 12.5 MG/1
12.5 TABLET ORAL DAILY
Status: DISCONTINUED | OUTPATIENT
Start: 2019-06-28 | End: 2019-06-30 | Stop reason: HOSPADM

## 2019-06-27 RX ORDER — CHOLECALCIFEROL (VITAMIN D3) 25 MCG
1000 TABLET ORAL NIGHTLY
Status: DISCONTINUED | OUTPATIENT
Start: 2019-06-27 | End: 2019-06-30 | Stop reason: HOSPADM

## 2019-06-27 RX ORDER — CALCIUM CARBONATE 500(1250)
500 TABLET ORAL DAILY
Status: DISCONTINUED | OUTPATIENT
Start: 2019-06-28 | End: 2019-06-30 | Stop reason: HOSPADM

## 2019-06-27 RX ORDER — ENOXAPARIN SODIUM 100 MG/ML
40 INJECTION SUBCUTANEOUS EVERY 24 HOURS
Status: DISCONTINUED | OUTPATIENT
Start: 2019-06-27 | End: 2019-06-30 | Stop reason: HOSPADM

## 2019-06-27 RX ORDER — BENAZEPRIL HYDROCHLORIDE 5 MG/1
5 TABLET ORAL NIGHTLY
Status: DISCONTINUED | OUTPATIENT
Start: 2019-06-27 | End: 2019-06-30 | Stop reason: HOSPADM

## 2019-06-27 RX ORDER — GLUCAGON 1 MG
1 KIT INJECTION
Status: DISCONTINUED | OUTPATIENT
Start: 2019-06-27 | End: 2019-06-30 | Stop reason: HOSPADM

## 2019-06-27 RX ORDER — CEFTRIAXONE 1 G/1
1 INJECTION, POWDER, FOR SOLUTION INTRAMUSCULAR; INTRAVENOUS
Status: DISCONTINUED | OUTPATIENT
Start: 2019-06-27 | End: 2019-06-28

## 2019-06-27 RX ORDER — IBUPROFEN 200 MG
16 TABLET ORAL
Status: DISCONTINUED | OUTPATIENT
Start: 2019-06-27 | End: 2019-06-30 | Stop reason: HOSPADM

## 2019-06-27 RX ORDER — TIMOLOL MALEATE 5 MG/ML
1 SOLUTION/ DROPS OPHTHALMIC EVERY MORNING
Status: DISCONTINUED | OUTPATIENT
Start: 2019-06-28 | End: 2019-06-30 | Stop reason: HOSPADM

## 2019-06-27 RX ORDER — CEFTRIAXONE 1 G/1
1 INJECTION, POWDER, FOR SOLUTION INTRAMUSCULAR; INTRAVENOUS
Status: COMPLETED | OUTPATIENT
Start: 2019-06-27 | End: 2019-06-27

## 2019-06-27 RX ORDER — LIDOCAINE HYDROCHLORIDE 10 MG/ML
10 INJECTION INFILTRATION; PERINEURAL
Status: COMPLETED | OUTPATIENT
Start: 2019-06-27 | End: 2019-06-27

## 2019-06-27 RX ORDER — VANCOMYCIN HCL IN 5 % DEXTROSE 1G/250ML
1000 PLASTIC BAG, INJECTION (ML) INTRAVENOUS
Status: COMPLETED | OUTPATIENT
Start: 2019-06-27 | End: 2019-06-27

## 2019-06-27 RX ORDER — RAMELTEON 8 MG/1
8 TABLET ORAL NIGHTLY PRN
Status: DISCONTINUED | OUTPATIENT
Start: 2019-06-27 | End: 2019-06-30 | Stop reason: HOSPADM

## 2019-06-27 RX ORDER — IBUPROFEN 200 MG
24 TABLET ORAL
Status: DISCONTINUED | OUTPATIENT
Start: 2019-06-27 | End: 2019-06-30 | Stop reason: HOSPADM

## 2019-06-27 RX ORDER — QUETIAPINE FUMARATE 25 MG/1
50 TABLET, FILM COATED ORAL NIGHTLY
Status: DISCONTINUED | OUTPATIENT
Start: 2019-06-27 | End: 2019-06-28

## 2019-06-27 RX ORDER — VANCOMYCIN HCL IN 5 % DEXTROSE 1G/250ML
15 PLASTIC BAG, INJECTION (ML) INTRAVENOUS
Status: DISCONTINUED | OUTPATIENT
Start: 2019-06-28 | End: 2019-06-30 | Stop reason: HOSPADM

## 2019-06-27 RX ORDER — CLINDAMYCIN PHOSPHATE 600 MG/50ML
600 INJECTION, SOLUTION INTRAVENOUS
Status: COMPLETED | OUTPATIENT
Start: 2019-06-27 | End: 2019-06-27

## 2019-06-27 RX ORDER — SODIUM CHLORIDE 9 MG/ML
INJECTION, SOLUTION INTRAVENOUS CONTINUOUS
Status: DISCONTINUED | OUTPATIENT
Start: 2019-06-28 | End: 2019-06-29

## 2019-06-27 RX ORDER — MEMANTINE HYDROCHLORIDE 10 MG/1
10 TABLET ORAL 2 TIMES DAILY
Status: DISCONTINUED | OUTPATIENT
Start: 2019-06-27 | End: 2019-06-30 | Stop reason: HOSPADM

## 2019-06-27 RX ADMIN — IOHEXOL 75 ML: 350 INJECTION, SOLUTION INTRAVENOUS at 04:06

## 2019-06-27 RX ADMIN — Medication 1 ML: at 04:06

## 2019-06-27 RX ADMIN — SENNOSIDES,DOCUSATE SODIUM 1 TABLET: 8.6; 5 TABLET, FILM COATED ORAL at 10:06

## 2019-06-27 RX ADMIN — BENAZEPRIL HYDROCHLORIDE 5 MG: 5 TABLET ORAL at 10:06

## 2019-06-27 RX ADMIN — QUETIAPINE FUMARATE 50 MG: 25 TABLET ORAL at 10:06

## 2019-06-27 RX ADMIN — CLINDAMYCIN IN 5 PERCENT DEXTROSE 600 MG: 12 INJECTION, SOLUTION INTRAVENOUS at 02:06

## 2019-06-27 RX ADMIN — SODIUM CHLORIDE 1000 ML: 0.9 INJECTION, SOLUTION INTRAVENOUS at 04:06

## 2019-06-27 RX ADMIN — SIMVASTATIN 40 MG: 20 TABLET, FILM COATED ORAL at 10:06

## 2019-06-27 RX ADMIN — ENOXAPARIN SODIUM 40 MG: 100 INJECTION SUBCUTANEOUS at 09:06

## 2019-06-27 RX ADMIN — LIDOCAINE HYDROCHLORIDE 10 ML: 10 INJECTION, SOLUTION INFILTRATION; PERINEURAL at 02:06

## 2019-06-27 RX ADMIN — LORAZEPAM 1 MG: 2 INJECTION INTRAMUSCULAR; INTRAVENOUS at 01:06

## 2019-06-27 RX ADMIN — VITAMIN D, TAB 1000IU (100/BT) 1000 UNITS: 25 TAB at 10:06

## 2019-06-27 RX ADMIN — VANCOMYCIN HYDROCHLORIDE 1000 MG: 1 INJECTION, POWDER, LYOPHILIZED, FOR SOLUTION INTRAVENOUS at 07:06

## 2019-06-27 RX ADMIN — CEFTRIAXONE SODIUM 1 G: 1 INJECTION, POWDER, FOR SOLUTION INTRAMUSCULAR; INTRAVENOUS at 06:06

## 2019-06-27 RX ADMIN — MEMANTINE 10 MG: 10 TABLET ORAL at 10:06

## 2019-06-27 NOTE — PROGRESS NOTES
Subjective:       Patient ID: Layla Arellano is a 78 y.o. female.        Chief Complaint: Facial Swelling (both red) and Mass (head itchy red x1day)    Layla Arellano is an established patient of Kevin Han MD here today for urgent care visit.    Her two daughters give all history.    Abscess right side of scalp x 1 week.  Patient picks at it regularly.  Yesterday, the abscess drained a little pus.  This morning, she woke up with redness/warmth on forehead extending down right side of face.  Both eyes were swollen shut and crusted as well.    No fever.         Review of Systems   Unable to perform ROS: Dementia       Objective:      Physical Exam   Constitutional: She appears well-developed and well-nourished. No distress.   HENT:   Head: Normocephalic and atraumatic.       Right Ear: External ear normal.   Left Ear: External ear normal.   Neck: Neck supple.   Pulmonary/Chest: Effort normal.   Abdominal: Soft.   Musculoskeletal: She exhibits no edema.   Neurological: She is alert.   Skin: Skin is warm and dry. She is not diaphoretic.       Assessment:       1. Abscess    2. Facial cellulitis    3. CKD (chronic kidney disease) stage 3, GFR 30-59 ml/min    4. Essential hypertension    5. Neurodegenerative dementia without behavioral disturbance        Plan:       Layla was seen today for facial swelling and mass.    Diagnoses and all orders for this visit:    Abscess  -     Refer to Emergency Dept.    Facial cellulitis  -     Refer to Emergency Dept.    CKD (chronic kidney disease) stage 3, GFR 30-59 ml/min    Essential hypertension    Neurodegenerative dementia without behavioral disturbance    To ED now for I&D abscess and IV antibiotics.    Pt has been given instructions populated from [employer] database and has verbalized understanding of the after visit summary and information contained wherein.    Follow up with a primary care provider. May go to ER for acute shortness of breath, lightheadedness, fever, or any  "other emergent complaints or changes in condition.    "This note will be shared with the patient"    Future Appointments   Date Time Provider Department Center   7/15/2019 11:00 AM Kevin Han MD [facility] [place]               "

## 2019-06-27 NOTE — ED PROVIDER NOTES
Encounter Date: 6/27/2019       History     Chief Complaint   Patient presents with    Cellulitis     Cellulitis to head.  Pt with dementia, daughters report pt with cellulitis since yesterday.     78 year old female with medical history of Alzheimer's disease, CKD, HTN, HLD presenting to the ED with the chief complaint of skin complaint. History provided by family member's at bedside 2/2 dementia. Patient's daughter noticed erythema and ulcerations to the right-side of her forehead and scalp yesterday. Patient has been scratching her forehead for the past week. No recent falls or trauma. Patient was evaluated at urgent care earlier today and referred to the ED for further evaluation. Patient's able to voice purposeful noises at her baseline.         Review of patient's allergies indicates:  No Known Allergies  Past Medical History:   Diagnosis Date    Alzheimer's disease     Arthritis     bilateral knee pain    CKD (chronic kidney disease) stage 3, GFR 30-59 ml/min 6/12/2015    Dementia     Dr.Truax Simran Mendez     Glaucoma     Heart murmur     Aortic stenosis    Hyperlipidemia     Hypertension     Macular degeneration - Both Eyes 5/10/2013    Obesity 8/15/2017    Osteoporosis 5/3/2016     Past Surgical History:   Procedure Laterality Date    CATARACT EXTRACTION W/  INTRAOCULAR LENS IMPLANT Left 4/12    OS dr. alexander    CATARACT EXTRACTION W/  INTRAOCULAR LENS IMPLANT Right 6/13    OD dr. alexander    COLONOSCOPY N/A 4/14/2014    Performed by LITO Schneider MD at [facility] ENDO (4TH FLR)    HYSTERECTOMY      INSERTION, IOL PROSTHESIS Right 6/26/2013    Performed by Karina Alexander MD at [facility] OR 1ST FLR    JOINT REPLACEMENT Left 09/2016    knee     KNEE SURGERY Left 09/12/2016    TKR    PHACOEMULSIFICATION, CATARACT Right 6/26/2013    Performed by Karina Alexander MD at [facility] OR 1ST FLR    REPLACEMENT-KNEE-TOTAL Left 9/12/2016    Performed by [facility] MD at [facility] OR 2ND FLR      Family History   Problem Relation Age of Onset    Stroke Mother     Cancer Mother 70        Lung    Heart attack Father 50    Heart disease Daughter     Glaucoma Maternal Grandmother     Melanoma Neg Hx     Amblyopia Neg Hx     Blindness Neg Hx     Macular degeneration Neg Hx     Retinal detachment Neg Hx     Strabismus Neg Hx      Social History     Tobacco Use    Smoking status: Never Smoker    Smokeless tobacco: Never Used   Substance Use Topics    Alcohol use: No     Comment: rarely    Drug use: No     Review of Systems   Unable to perform ROS: Dementia   Skin: Positive for rash.     Physical Exam     Initial Vitals [06/27/19 1234]   BP Pulse Resp Temp SpO2   134/70 82 16 99.3 °F (37.4 °C) 99 %      MAP       --         Physical Exam    Constitutional: She appears well-developed and well-nourished. She is not diaphoretic. No distress.   HENT:   Head: Normocephalic.   Right Ear: External ear normal.   Left Ear: External ear normal.   Mouth/Throat: Oropharynx is clear and moist. No oropharyngeal exudate.   Erythematous rash overlying R-side of forehead, scalp, and R periorbital region. Crosses midline of forehead. Few areas of induration with overlying ulcerations.   Cerumen impaction to bilateral ears. No erythema or edema noted to external ear canals   Eyes: EOM are normal. Pupils are equal, round, and reactive to light.   Neck: Normal range of motion. Neck supple.   Cardiovascular: Normal rate, regular rhythm and intact distal pulses.   Pulmonary/Chest: Breath sounds normal. No respiratory distress. She has no wheezes.   Abdominal: Soft. There is no tenderness.   Musculoskeletal: Normal range of motion. She exhibits no edema or tenderness.   Neurological: She is alert.   Skin: Skin is warm and dry.             ED Course   I & D - Incision and Drainage  Date/Time: 6/27/2019 5:27 PM  Performed by: Patrick Solano PA-C  Authorized by: Efren Dunbar MD   Type: abscess  Body area:  head/neck  Location details: scalp  Anesthesia: local infiltration    Anesthesia:  Local Anesthetic: topical anesthetic and lidocaine 1% without epinephrine  Anesthetic total: 2 mL  Scalpel size: 11  Incision type: single straight  Complexity: simple  Drainage: pus  Drainage amount: scant  Wound treatment: incision and  wound left open  Packing material: none  Specimens: Yes    Critical Care  Date/Time: 6/28/2019 7:04 AM  Performed by: Patrick Solano PA-C  Authorized by: Efren Dunbar MD   Direct patient critical care time: 25 minutes  Additional history critical care time: 10 minutes  Ordering / reviewing critical care time: 5 minutes  Documentation critical care time: 5 minutes  Consulting other physicians critical care time: 5 minutes  Total critical care time (exclusive of procedural time) : 50 minutes  Critical care was necessary to treat or prevent imminent or life-threatening deterioration of the following conditions: sepsis.  Critical care was time spent personally by me on the following activities: evaluation of patient's response to treatment, examination of patient, ordering and performing treatments and interventions, ordering and review of laboratory studies, ordering and review of radiographic studies, re-evaluation of patient's condition, review of old charts and development of treatment plan with patient or surrogate.        Labs Reviewed   CBC W/ AUTO DIFFERENTIAL - Abnormal; Notable for the following components:       Result Value    WBC 19.03 (*)     MPV 13.4 (*)     Gran # (ANC) 14.3 (*)     Immature Grans (Abs) 0.10 (*)     Mono # 2.0 (*)     Gran% 75.2 (*)     Lymph% 12.8 (*)     All other components within normal limits   COMPREHENSIVE METABOLIC PANEL - Abnormal; Notable for the following components:    CO2 32 (*)     Glucose 175 (*)     Albumin 3.2 (*)     eGFR if non  53.7 (*)     All other components within normal limits   LACTIC ACID, PLASMA - Abnormal; Notable for  the following components:    Lactate (Lactic Acid) 2.6 (*)     All other components within normal limits   SEDIMENTATION RATE - Abnormal; Notable for the following components:    Sed Rate 46 (*)     All other components within normal limits   C-REACTIVE PROTEIN - Abnormal; Notable for the following components:    .8 (*)     All other components within normal limits   CULTURE, BLOOD   CULTURE, BLOOD   CULTURE, AEROBIC  (SPECIFY SOURCE)   MAGNESIUM          Imaging Results          CT Orbits Sella Post Fossa With Contrast (Final result)  Result time 06/27/19 17:07:11    Final result by Tj Lugo MD (06/27/19 17:07:11)                 Impression:      1. Periorbital edema, noting possible small right frontal fluid collection, no findings to suggest postseptal involvement.  2. Sinus disease.  3. Please see above for additional findings.      Electronically signed by: Tj Lugo MD  Date:    06/27/2019  Time:    17:07             Narrative:    EXAMINATION:  CT ORBITS SELLA POST FOSSA WITH CONTRAST    CLINICAL HISTORY:  Facial cellulitis with R pre-orbital involvement;    TECHNIQUE:  Axial images of the orbital region were obtained at 2 mm intervals following administration of 75 cc omni 350 IV contrast.  Coronal and sagittal reformatted images were reviewed.    COMPARISON:  MRI 05/04/2015    FINDINGS:  There is mild mucous membrane thickening of the maxillary sinuses noting several mucous retention cysts or polyps.  There is minimal mucous membrane thickening of the ethmoid sinuses.  The mastoid air cells are clear.  The ossicular chains are intact.  No acute displaced maxillofacial fracture.  The visualized portions of the brain parenchyma are remarkable for generalized cerebral volume loss, and sequela of chronic microvascular ischemic change.  The left vertebral artery is dominant.  The bilateral intracranial internal carotid arteries are patent.  There is essentially fetal supply of the left PCA  noting some irregularity along the vessels suggesting atherosclerotic narrowing.  Additional regions of atherosclerotic narrowing are noted of the right PCA.  No focal occlusion.  No definite aneurysm.  The anterior circulation appears grossly patent.    There is soft tissue edema/induration involving the right frontal/supraorbital soft tissues.  Within this region, there is a questionable subcentimeter focus of fluid measuring 0.4 x 0.3 cm.  There is mild infraorbital right induration.  The right globe and orbital content is unremarkable.  No postseptal involvement.  No radiopaque foreign body.  Degenerative changes are noted of the spine.                                 Medical Decision Making:   History:   Old Medical Records: I decided to obtain old medical records.  Old Records Summarized: records from clinic visits.  Clinical Tests:   Lab Tests: Ordered and Reviewed  Radiological Study: Ordered and Reviewed  Other:   I have discussed this case with another health care provider.       <> Summary of the Discussion: Hospital Medicine       APC / Resident Notes:   78 year old female with medical history of Alzheimer's disease, CKD, HTN, HLD presenting to the ED c/o facial rash. DDx includes but not limited to cellulitis, abscess, shingles, allergic dermatitis, dermatophyte infestation, eczema. Will obtain labs. Will give Ativan IM for patient comfort as she is resistant to IV access. DDx includes but not limited to dermatophyte infestation, atopic dermatitis, allergic dermatitis, eczema, psoriasis or drug reaction.    Work-up significant for leukocytosis 19, H/H 14/44, Plt 212, CMP unremarkable, Crt 1.0, Lactate elevated 2.6, ESR 46,   CT orbits shows periorbital edema right small right frontal fluid collection. No findings suggesting postseptal involvement    Etiology concerning for facial cellulitis with R periorbital involvement. Clindamycin IV given for coverage in the ED. Vanc and Rocephin additionally  ordered per  request. I&D performed on small abscess of R upper forehead with scant pus expressed. Wound culture obtained. Will admit patient for further management. Patient's family expresses understanding and agreeable to the plan. I have discussed the care of this patient with my supervising physician.                    Clinical Impression:       ICD-10-CM ICD-9-CM   1. Facial cellulitis L03.211 682.0   2. Leukocytosis, unspecified type D72.829 288.60   3. Dementia without behavioral disturbance, unspecified dementia type F03.90 294.20   4. Lactate blood increase R79.89 276.2   5. Sepsis, due to unspecified organism A41.9 038.9     995.91         Disposition:   Disposition: Admitted  Condition: Aimee Solano PA-C  06/28/19 0706

## 2019-06-28 PROBLEM — A41.9 SEPSIS: Status: ACTIVE | Noted: 2019-06-28

## 2019-06-28 PROBLEM — L02.91 ABSCESS: Status: ACTIVE | Noted: 2019-06-28

## 2019-06-28 PROBLEM — L02.811 SCALP ABSCESS: Status: ACTIVE | Noted: 2019-06-28

## 2019-06-28 LAB
ALBUMIN SERPL BCP-MCNC: 2.7 G/DL (ref 3.5–5.2)
ALP SERPL-CCNC: 101 U/L (ref 55–135)
ALT SERPL W/O P-5'-P-CCNC: 14 U/L (ref 10–44)
ANION GAP SERPL CALC-SCNC: 11 MMOL/L (ref 8–16)
AST SERPL-CCNC: 18 U/L (ref 10–40)
BASOPHILS # BLD AUTO: 0.06 K/UL (ref 0–0.2)
BASOPHILS NFR BLD: 0.4 % (ref 0–1.9)
BILIRUB SERPL-MCNC: 0.5 MG/DL (ref 0.1–1)
BUN SERPL-MCNC: 14 MG/DL (ref 8–23)
CALCIUM SERPL-MCNC: 9 MG/DL (ref 8.7–10.5)
CHLORIDE SERPL-SCNC: 104 MMOL/L (ref 95–110)
CO2 SERPL-SCNC: 22 MMOL/L (ref 23–29)
CREAT SERPL-MCNC: 0.8 MG/DL (ref 0.5–1.4)
DIFFERENTIAL METHOD: ABNORMAL
EOSINOPHIL # BLD AUTO: 0.3 K/UL (ref 0–0.5)
EOSINOPHIL NFR BLD: 1.8 % (ref 0–8)
ERYTHROCYTE [DISTWIDTH] IN BLOOD BY AUTOMATED COUNT: 14.5 % (ref 11.5–14.5)
EST. GFR  (AFRICAN AMERICAN): >60 ML/MIN/1.73 M^2
EST. GFR  (NON AFRICAN AMERICAN): >60 ML/MIN/1.73 M^2
ESTIMATED AVG GLUCOSE: 148 MG/DL (ref 68–131)
GLUCOSE SERPL-MCNC: 148 MG/DL (ref 70–110)
HBA1C MFR BLD HPLC: 6.8 % (ref 4–5.6)
HCT VFR BLD AUTO: 42.7 % (ref 37–48.5)
HGB BLD-MCNC: 13.5 G/DL (ref 12–16)
IMM GRANULOCYTES # BLD AUTO: 0.07 K/UL (ref 0–0.04)
IMM GRANULOCYTES NFR BLD AUTO: 0.4 % (ref 0–0.5)
LYMPHOCYTES # BLD AUTO: 2.6 K/UL (ref 1–4.8)
LYMPHOCYTES NFR BLD: 16.2 % (ref 18–48)
MAGNESIUM SERPL-MCNC: 2.1 MG/DL (ref 1.6–2.6)
MCH RBC QN AUTO: 30.4 PG (ref 27–31)
MCHC RBC AUTO-ENTMCNC: 31.6 G/DL (ref 32–36)
MCV RBC AUTO: 96 FL (ref 82–98)
MONOCYTES # BLD AUTO: 1.6 K/UL (ref 0.3–1)
MONOCYTES NFR BLD: 9.6 % (ref 4–15)
NEUTROPHILS # BLD AUTO: 11.5 K/UL (ref 1.8–7.7)
NEUTROPHILS NFR BLD: 71.6 % (ref 38–73)
NRBC BLD-RTO: 0 /100 WBC
PHOSPHATE SERPL-MCNC: 2.9 MG/DL (ref 2.7–4.5)
PLATELET # BLD AUTO: 178 K/UL (ref 150–350)
PMV BLD AUTO: 13.6 FL (ref 9.2–12.9)
POTASSIUM SERPL-SCNC: 3.8 MMOL/L (ref 3.5–5.1)
PROT SERPL-MCNC: 6.4 G/DL (ref 6–8.4)
RBC # BLD AUTO: 4.44 M/UL (ref 4–5.4)
SODIUM SERPL-SCNC: 137 MMOL/L (ref 136–145)
VANCOMYCIN TROUGH SERPL-MCNC: 14.8 UG/ML (ref 10–22)
WBC # BLD AUTO: 16.07 K/UL (ref 3.9–12.7)

## 2019-06-28 PROCEDURE — 80202 ASSAY OF VANCOMYCIN: CPT | Mod: HCNC

## 2019-06-28 PROCEDURE — 25000003 PHARM REV CODE 250: Mod: HCNC | Performed by: INTERNAL MEDICINE

## 2019-06-28 PROCEDURE — 83735 ASSAY OF MAGNESIUM: CPT | Mod: HCNC

## 2019-06-28 PROCEDURE — 84100 ASSAY OF PHOSPHORUS: CPT | Mod: HCNC

## 2019-06-28 PROCEDURE — 11000001 HC ACUTE MED/SURG PRIVATE ROOM: Mod: HCNC

## 2019-06-28 PROCEDURE — 83036 HEMOGLOBIN GLYCOSYLATED A1C: CPT | Mod: HCNC

## 2019-06-28 PROCEDURE — 99232 PR SUBSEQUENT HOSPITAL CARE,LEVL II: ICD-10-PCS | Mod: HCNC,,, | Performed by: HOSPITALIST

## 2019-06-28 PROCEDURE — 25000003 PHARM REV CODE 250: Mod: HCNC | Performed by: HOSPITALIST

## 2019-06-28 PROCEDURE — 80053 COMPREHEN METABOLIC PANEL: CPT | Mod: HCNC

## 2019-06-28 PROCEDURE — 86060 ANTISTREPTOLYSIN O TITER: CPT | Mod: HCNC

## 2019-06-28 PROCEDURE — 85025 COMPLETE CBC W/AUTO DIFF WBC: CPT | Mod: HCNC

## 2019-06-28 PROCEDURE — 99232 SBSQ HOSP IP/OBS MODERATE 35: CPT | Mod: HCNC,,, | Performed by: HOSPITALIST

## 2019-06-28 PROCEDURE — 36415 COLL VENOUS BLD VENIPUNCTURE: CPT | Mod: HCNC

## 2019-06-28 PROCEDURE — 63600175 PHARM REV CODE 636 W HCPCS: Mod: HCNC | Performed by: INTERNAL MEDICINE

## 2019-06-28 RX ORDER — QUETIAPINE FUMARATE 25 MG/1
50 TABLET, FILM COATED ORAL EVERY 8 HOURS PRN
Status: DISCONTINUED | OUTPATIENT
Start: 2019-06-28 | End: 2019-06-30 | Stop reason: HOSPADM

## 2019-06-28 RX ORDER — CEFTRIAXONE 1 G/1
1 INJECTION, POWDER, FOR SOLUTION INTRAMUSCULAR; INTRAVENOUS
Status: DISCONTINUED | OUTPATIENT
Start: 2019-06-29 | End: 2019-06-29

## 2019-06-28 RX ORDER — QUETIAPINE FUMARATE 50 MG/1
75 TABLET, FILM COATED ORAL NIGHTLY
COMMUNITY
End: 2019-12-23 | Stop reason: SDUPTHER

## 2019-06-28 RX ORDER — QUETIAPINE FUMARATE 25 MG/1
75 TABLET, FILM COATED ORAL NIGHTLY
Status: DISCONTINUED | OUTPATIENT
Start: 2019-06-28 | End: 2019-06-30 | Stop reason: HOSPADM

## 2019-06-28 RX ORDER — FLUCONAZOLE 100 MG/1
100 TABLET ORAL DAILY
Status: DISCONTINUED | OUTPATIENT
Start: 2019-06-28 | End: 2019-06-30 | Stop reason: HOSPADM

## 2019-06-28 RX ADMIN — SODIUM CHLORIDE: 0.9 INJECTION, SOLUTION INTRAVENOUS at 01:06

## 2019-06-28 RX ADMIN — CALCIUM 500 MG: 500 TABLET ORAL at 10:06

## 2019-06-28 RX ADMIN — QUETIAPINE FUMARATE 50 MG: 25 TABLET ORAL at 01:06

## 2019-06-28 RX ADMIN — SENNOSIDES,DOCUSATE SODIUM 1 TABLET: 8.6; 5 TABLET, FILM COATED ORAL at 09:06

## 2019-06-28 RX ADMIN — VANCOMYCIN HYDROCHLORIDE 1000 MG: 1 INJECTION, POWDER, LYOPHILIZED, FOR SOLUTION INTRAVENOUS at 09:06

## 2019-06-28 RX ADMIN — HYDROCHLOROTHIAZIDE 12.5 MG: 12.5 TABLET ORAL at 10:06

## 2019-06-28 RX ADMIN — TIMOLOL MALEATE 1 DROP: 5 SOLUTION OPHTHALMIC at 06:06

## 2019-06-28 RX ADMIN — DONEPEZIL HYDROCHLORIDE 10 MG: 10 TABLET ORAL at 10:06

## 2019-06-28 RX ADMIN — MEMANTINE 10 MG: 10 TABLET ORAL at 09:06

## 2019-06-28 RX ADMIN — QUETIAPINE FUMARATE 75 MG: 25 TABLET ORAL at 09:06

## 2019-06-28 RX ADMIN — SIMVASTATIN 40 MG: 20 TABLET, FILM COATED ORAL at 09:06

## 2019-06-28 RX ADMIN — BENAZEPRIL HYDROCHLORIDE 5 MG: 5 TABLET ORAL at 09:06

## 2019-06-28 RX ADMIN — MICONAZOLE NITRATE: 20 POWDER TOPICAL at 09:06

## 2019-06-28 RX ADMIN — BIMATOPROST 1 DROP: 0.1 SOLUTION/ DROPS OPHTHALMIC at 01:06

## 2019-06-28 RX ADMIN — MICONAZOLE NITRATE: 20 POWDER TOPICAL at 11:06

## 2019-06-28 RX ADMIN — SENNOSIDES,DOCUSATE SODIUM 1 TABLET: 8.6; 5 TABLET, FILM COATED ORAL at 10:06

## 2019-06-28 RX ADMIN — OXYCODONE HYDROCHLORIDE 5 MG: 5 TABLET ORAL at 11:06

## 2019-06-28 RX ADMIN — BACITRACIN ZINC NEOMYCIN SULFATE POLYMYXIN B SULFATE: 400; 3.5; 5 OINTMENT TOPICAL at 09:06

## 2019-06-28 RX ADMIN — MEMANTINE 10 MG: 10 TABLET ORAL at 10:06

## 2019-06-28 RX ADMIN — CEFTRIAXONE SODIUM 1 G: 1 INJECTION, POWDER, FOR SOLUTION INTRAMUSCULAR; INTRAVENOUS at 09:06

## 2019-06-28 RX ADMIN — ENOXAPARIN SODIUM 40 MG: 100 INJECTION SUBCUTANEOUS at 07:06

## 2019-06-28 RX ADMIN — FLUCONAZOLE 100 MG: 100 TABLET ORAL at 01:06

## 2019-06-28 RX ADMIN — VITAMIN D, TAB 1000IU (100/BT) 1000 UNITS: 25 TAB at 09:06

## 2019-06-28 NOTE — ASSESSMENT & PLAN NOTE
Facial and periorbital cellulitis  Sepsis  CTH done.  Focus of fluid measuring 0.4 x 0.3 cm with No findings to suggest postseptal involvement.  S/P I&D in the ED with cxs sent.  On admission had Periorbital edema, WBC of 19k, and tachy.    WCx grew staph  -Wound care and dress with Bactroban  -cefTRIAXone injection 1 g, 1 g, Intravenous, Q12H  -vancomycin in dextrose 5 % 1 gram/250 mL IVPB 1,000 mg, 15 mg/kg, Intravenous, Q12H

## 2019-06-28 NOTE — PROGRESS NOTES
"[facility] Hospital Medicine  Progress Note    Patient Name: Layla Arellano  MRN: [id number]  Patient Class: IP- Inpatient   Admission Date: 6/27/2019  Length of Stay: 1 days  Attending Physician: Keo Soto MD  Primary Care Provider: Kevin Han MD    [facility] Medicine Team: [facility]OSP MED  Keo Soto MD    Subjective:     Principal Problem:Scalp abscess      HPI:  Layla Arellano is a clinic patient of Kvein Han MD.  She has a history of advanced Alzheimer's disease, CKD3 and HTN.  She was brought by her two daughters to urgent care clinic today due to a scalp abscess for about 1 week that the patient has been picking at.  This morning the local area became red around her forehead then down her right face.  By the time she got to urgent care clinic, her eyes were swollen and crusted shut.  Currently she has one daughter with her who relates all the history.     She says that her mother has been recurrently picking with her scalp for years, scratching at times until she bleeds.  About one week ago she noticed a little boil coming up on her scalp, so she applied hot compresses.  Regardless, the patient continuously picked at the area, re-inflaming it.  The daughter awoke this morning and, "her whole face was red on the right side and her eyes were swollen shut.  She denies fever or chills.    Overview/Hospital Course:  See problem list    Interval History:   Symptoms:  Improved swelling.    Diet:  Adequate intake.    Activity level:  Impaired due to chronic confusion.  Pain:  No pain.       Review of Systems   Constitutional: Negative for fever.   Respiratory: Negative for shortness of breath.    Cardiovascular: Negative for chest pain.   Gastrointestinal: Negative for abdominal pain.   Psychiatric/Behavioral: Positive for confusion. Negative for agitation.     Objective:     Vital Signs (Most Recent):  Temp: 99 °F (37.2 °C) (06/28/19 1140)  Pulse: 85 (06/28/19 1140)  Resp: 18 " (06/28/19 1140)  BP: (!) 142/65 (06/28/19 1140)  SpO2: (!) 92 % (06/28/19 1140) Vital Signs (24h Range):  Temp:  [98.7 °F (37.1 °C)-99.3 °F (37.4 °C)] 99 °F (37.2 °C)  Pulse:  [] 85  Resp:  [16-24] 18  SpO2:  [92 %-96 %] 92 %  BP: (111-142)/(54-65) 142/65     Weight: 74.8 kg (164 lb 12.8 oz)  Body mass index is 32.19 kg/m².    Intake/Output Summary (Last 24 hours) at 6/28/2019 1322  Last data filed at 6/27/2019 2046  Gross per 24 hour   Intake 1300 ml   Output --   Net 1300 ml      Physical Exam   Constitutional: No distress.   Eyes: Right eye exhibits no discharge. Left eye exhibits no discharge.   Neck: No JVD present.   Cardiovascular: Normal rate, regular rhythm and normal heart sounds.   Pulmonary/Chest: Effort normal and breath sounds normal. No respiratory distress.   Abdominal: Soft. Bowel sounds are normal. She exhibits no distension.   Musculoskeletal: She exhibits no edema.   Neurological: She is alert.   Not conversant.   Skin: Skin is warm and dry.   RU forehead lesion has scab.  Erythema receded to forehead.  No edema in lids.       Significant Labs: All pertinent labs within the past 24 hours have been reviewed.    Significant Imaging: I have reviewed and interpreted all pertinent imaging results/findings within the past 24 hours.      Assessment/Plan:      * Scalp abscess  Facial and periorbital cellulitis  Sepsis  CTH done.  Focus of fluid measuring 0.4 x 0.3 cm with No findings to suggest postseptal involvement.  S/P I&D in the ED with cxs sent.  On admission had Periorbital edema, WBC of 19k, and tachy.    WCx grew staph  -Wound care and dress with Bactroban  -cefTRIAXone injection 1 g, 1 g, Intravenous, Q12H  -vancomycin in dextrose 5 % 1 gram/250 mL IVPB 1,000 mg, 15 mg/kg, Intravenous, Q12H        Neurodegenerative dementia without behavioral disturbance  -memantine tablet 10 mg, 10 mg, Oral, BID  -QUEtiapine tablet 75 mg,  Oral, QHS      CKD (chronic kidney disease) stage 3, GFR 30-59  ml/min  -renal dose all meds  -Consulted Pharmacy for Vanco dosing      Essential hypertension  -benazepril tablet 5 mg, 5 mg, Oral, QHS  -hydroCHLOROthiazide tablet 12.5 mg, 12.5 mg, Oral, Daily        VTE Risk Mitigation (From admission, onward)        Ordered     enoxaparin injection 40 mg  Daily      06/27/19 2045     Place JANNET hose  Until discontinued      06/27/19 2045     Place sequential compression device  Until discontinued      06/27/19 2045     IP VTE HIGH RISK PATIENT  Once      06/27/19 2045                Keo Soto MD  Department of Hospital Medicine   [facility]

## 2019-06-28 NOTE — PROGRESS NOTES
Consult received per MD for I & D frontal scalp. Pt was admitted on 6/27/19 for a Frontal scalp abscess with face and daniela-orbital cellulitis. Pt has a PMHx of advanced Alzheimer's disease, CKD3 and HTN.  Pt was brought by her two daughters to urgent care clinic due to a scalp abscess for about 1 week that the patient has been picking at. Pt's family instructed to take pt to ED.    Received pt lying in bed with eyes closed and respirations even and unlabored. Pt's daughter at bedside reports that pt was given some medication to help her relax. Daughter gave permission to wound care nurse to cut the hair around the abscess site.    Upon assessment of pt's right scalp: wound area with dried bloody exudate without any packing noted and wound left open to air. Cleansed wound with sterile normal saline to reveal red and pink granular tissue with edema and erythema noted to edges of wound. Pt still has some streaking redness noted to face; however, after reviewing previous media files this has improved since yesterday. Pt is also able to open her eyes.    (see assessment and photo below)    Lightly packed wound bed with aquacel ag rope and covered with a mepore border gauze cut to fit and secured with paper tape. Dressing can be changed every 2 days.    Recommendations:  -Nursing to cleanse I & D site to right scalp with sterile normal saline and lightly pack site with aquacel ag rope or if wound becomes no longer to shallow to pack apply aquacel across wound site with a mepore border gauze cut to fit. Secure with paper tape PRN. Change dressing every 2 days.Aquacel Ag is a silver impregnated dressing that absorbs high amounts of wound fluid and bacteria and creates a soft, cohesive gel that intimately conforms to the wound surface, maintains a moist environment and aids in the removal of non-viable tissue from the wound (autolytic debridement.  -Nursing to maintain all pressure prevention interventions to include:EHOB  waffle overlay,  heel protectors, urinary diversion device (Pure-wick, catheter), fecal management system(if needed for stools),turn q 2 hours, pillows/wedge for repositioning, blue pad to wick away moisture, HOB no higher than 30 degrees/knee gatch up unless otherwise indicated,  barrier cream/paste as needed for moisture control, and adequate calories.       Discussed plan of care with pt's daughter at bedside and with Dr. Soto via secure chat. Wound care will continue to follow pt PRN. [id number]     06/28/19 1512        Wound 06/28/19 Abscess  Scalp   Date First Assessed: 06/28/19   Primary Wound Type: Abscess  Orientation: (c)   Location: Scalp   Wound Image    Wound WDL ex   Dressing Appearance Open to air;Dried drainage   Drainage Amount Small   Drainage Characteristics/Odor Other (see comments)  (dried bloody drainage)   Appearance Pink;Red;Moist;Granulating   Periwound Area Edematous;Redness   Wound Edges Open   Wound Length (cm) 0.5 cm   Wound Width (cm) 0.5 cm   Wound Depth (cm) 0.6 cm   Wound Volume (cm^3) 0.15 cm^3   Wound Surface Area (cm^2) 0.25 cm^2   Care Cleansed with:;Sterile normal saline;Applied:;Skin Barrier   Dressing Applied;Hydrofiber;Silver;Island/border  (island border cut to fit)   Packing Incision packed with;other (see comments)  (aquacel ag rope)   Packing Inserted  1   Periwound Care Skin barrier film applied   Dressing Change Due 06/30/19

## 2019-06-28 NOTE — SUBJECTIVE & OBJECTIVE
Interval History:   Symptoms:  Improved swelling.    Diet:  Adequate intake.    Activity level:  Impaired due to chronic confusion.  Pain:  No pain.       Review of Systems   Constitutional: Negative for fever.   Respiratory: Negative for shortness of breath.    Cardiovascular: Negative for chest pain.   Gastrointestinal: Negative for abdominal pain.   Psychiatric/Behavioral: Positive for confusion. Negative for agitation.     Objective:     Vital Signs (Most Recent):  Temp: 99 °F (37.2 °C) (06/28/19 1140)  Pulse: 85 (06/28/19 1140)  Resp: 18 (06/28/19 1140)  BP: (!) 142/65 (06/28/19 1140)  SpO2: (!) 92 % (06/28/19 1140) Vital Signs (24h Range):  Temp:  [98.7 °F (37.1 °C)-99.3 °F (37.4 °C)] 99 °F (37.2 °C)  Pulse:  [] 85  Resp:  [16-24] 18  SpO2:  [92 %-96 %] 92 %  BP: (111-142)/(54-65) 142/65     Weight: 74.8 kg (164 lb 12.8 oz)  Body mass index is 32.19 kg/m².    Intake/Output Summary (Last 24 hours) at 6/28/2019 1322  Last data filed at 6/27/2019 2046  Gross per 24 hour   Intake 1300 ml   Output --   Net 1300 ml      Physical Exam   Constitutional: No distress.   Eyes: Right eye exhibits no discharge. Left eye exhibits no discharge.   Neck: No JVD present.   Cardiovascular: Normal rate, regular rhythm and normal heart sounds.   Pulmonary/Chest: Effort normal and breath sounds normal. No respiratory distress.   Abdominal: Soft. Bowel sounds are normal. She exhibits no distension.   Musculoskeletal: She exhibits no edema.   Neurological: She is alert.   Not conversant.   Skin: Skin is warm and dry.   RU forehead lesion has scab.  Erythema receded to forehead.  No edema in lids.       Significant Labs: All pertinent labs within the past 24 hours have been reviewed.    Significant Imaging: I have reviewed and interpreted all pertinent imaging results/findings within the past 24 hours.

## 2019-06-28 NOTE — H&P
"[facility] Hospital Medicine  History & Physical    Patient Name: Layla Arellano  MRN: [id number]  Admission Date: 6/27/2019  Attending Physician: PARMJIT Bishop MD   Primary Care Provider: Kevin Han MD    [place] [facility] Medicine Team: [facility] MED S SOCORRO Bishop MD     Patient information was obtained from patient, past medical records and ER records.     Subjective:     Principal Problem:  Frontal scalp abscess with face and daniela-orbital cellulitis    Chief Complaint:   Chief Complaint   Patient presents with    Cellulitis     Cellulitis to head.  Pt with dementia, daughters report pt with cellulitis since yesterday.        HPI:   Layla Arellano is a clinic patient of Kevin Han MD.  She has a history of advanced Alzheimer's disease, CKD3 and HTN.  She was brought by her two daughters to urgent care clinic today due to a scalp abscess for about 1 week that the patient has been picking at.  This morning the local area became red around her forehead then down her right face.  By the time she got to urgent care clinic, her eyes were swollen and crusted shut.  Currently she has one daughter with her who relates all the history.    She says that her mother has been recurrently picking with her scalp for years, scratching at times until she bleeds.  About one week ago she noticed a little boil coming up on her scalp, so she applied hot compresses.  Regardless, the patient continuously picked at the area, re-inflaming it.  The daughter awoke this morning and, "her whole face was red on the right side and her eyes were swollen shut.  She denies fever or chills.     Past Medical History:   Diagnosis Date    Alzheimer's disease     Arthritis     bilateral knee pain    CKD (chronic kidney disease) stage 3, GFR 30-59 ml/min 6/12/2015    Dementia         Dementia     Glaucoma     Heart murmur     Aortic stenosis    Hyperlipidemia     Hypertension     Macular degeneration - Both Eyes " 5/10/2013    Obesity 8/15/2017    Osteoporosis 5/3/2016       Past Surgical History:   Procedure Laterality Date    CATARACT EXTRACTION W/  INTRAOCULAR LENS IMPLANT Left 4/12    OS dr. alexander    CATARACT EXTRACTION W/  INTRAOCULAR LENS IMPLANT Right 6/13    OD dr. alexander    COLONOSCOPY N/A 4/14/2014    Performed by LITO Schneider MD at [facility] ENDO (4TH FLR)    HYSTERECTOMY      INSERTION, IOL PROSTHESIS Right 6/26/2013    Performed by Karina Alexander MD at [facility] OR 1ST FLR    JOINT REPLACEMENT Left 09/2016    knee     KNEE SURGERY Left 09/12/2016    TKR    PHACOEMULSIFICATION, CATARACT Right 6/26/2013    Performed by Karina Alexander MD at [facility] OR 1ST FLR    REPLACEMENT-KNEE-TOTAL Left 9/12/2016    Per[facility] by [facility] at [facility] OR 2ND FLR       Review of patient's allergies indicates:  No Known Allergies    No current facility-administered medications on file prior to encounter.      Current Outpatient Medications on File Prior to Encounter   Medication Sig    benazepril (LOTENSIN) 5 MG tablet TAKE 1 TABLET (5 MG TOTAL) BY MOUTH EVERY EVENING.    BETA-CAROTENE,A, W-C & E/MIN (OCUVITE ORAL) Take by mouth once daily.     bimatoprost (LUMIGAN) 0.01 % Drop Place 1 drop into both eyes once daily.    calcium carbonate (OS-MARIO) 500 mg calcium (1,250 mg) tablet Take 1 tablet by mouth once daily.    donepezil (ARICEPT) 10 MG tablet TAKE 1 TABLET EVERY DAY    hydroCHLOROthiazide (HYDRODIURIL) 12.5 MG Tab TAKE 1 TABLET EVERY DAY    lactulose (CEPHULAC) 10 gram packet Take 10 g by mouth 3 (three) times daily.    memantine (NAMENDA) 10 MG Tab TAKE 1 TABLET TWICE DAILY    QUEtiapine (SEROQUEL) 50 MG tablet Take 1 tablet (50 mg total) by mouth every evening.    simvastatin (ZOCOR) 40 MG tablet TAKE 1 TABLET EVERY EVENING    timolol maleate 0.5% (TIMOPTIC) 0.5 % Drop INSTILL 1 DROP INTO BOTH EYES EVERY MORNING    vitamin D 1000 units Tab Take 185 mg by mouth every evening.      alendronate (FOSAMAX) 70 MG tablet TAKE 1 TABLET EVERY 7 DAYS. SEE PACKAGE FOR ADDITIONAL INSTRUCTIONS    NYSTATIN (DUKE'S SOLUTION) Take 10 mLs by mouth 4 (four) times daily. 30 ml benedryl, 30ml nystatin, 30ml lidocaine, 30ml mylanta    nystatin-triamcinolone (MYCOLOG) ointment APPLY TOPICALLY TO AFFECTED AREA 2 (TWO) TIMES DAILY.    nystatin-triamcinolone (MYCOLOG) ointment APPLY TOPICALLY TO AFFECTED AREA 2 (TWO) TIMES DAILY.     Family History     Problem Relation (Age of Onset)    Cancer Mother (70)    Glaucoma Maternal Grandmother    Heart attack Father (50)    Heart disease Daughter    Stroke Mother        Tobacco Use    Smoking status: Never Smoker    Smokeless tobacco: Never Used   Substance and Sexual Activity    Alcohol use: No     Comment: rarely    Drug use: No    Sexual activity: Never     Review of Systems   Unable to perform ROS: Dementia     Objective:     Vital Signs (Most Recent):  Temp: 99.3 °F (37.4 °C) (06/27/19 1234)  Pulse: 91 (06/27/19 1832)  Resp: (!) 24 (06/27/19 1832)  BP: (!) 122/58 (06/27/19 1832)  SpO2: 95 % (06/27/19 1832) Vital Signs (24h Range):  Temp:  [97.9 °F (36.6 °C)-99.3 °F (37.4 °C)] 99.3 °F (37.4 °C)  Pulse:  [] 91  Resp:  [16-24] 24  SpO2:  [94 %-99 %] 95 %  BP: (122-134)/(58-93) 122/58     Weight: 68.9 kg (152 lb)  Body mass index is 29.69 kg/m².    Physical Exam   Constitutional: She appears well-developed and well-nourished. She appears listless.  Non-toxic appearance. She does not have a sickly appearance. She does not appear ill. No distress.   HENT:   Head: Normocephalic and atraumatic.   Mouth/Throat: No oropharyngeal exudate.   Eyes: Pupils are equal, round, and reactive to light. Conjunctivae and EOM are normal. Right eye exhibits no discharge. Left eye exhibits no discharge. No scleral icterus.   Bilateral periorbital swelling and edema to lids.   Neck: Normal range of motion. Neck supple. No JVD present. No tracheal deviation present. No  thyromegaly present.   Cardiovascular: Normal rate, regular rhythm, normal heart sounds and intact distal pulses. Exam reveals no gallop and no friction rub.   No murmur heard.  Pulmonary/Chest: Effort normal and breath sounds normal. No accessory muscle usage or stridor. No tachypnea and no bradypnea. No respiratory distress. She has no wheezes. She has no rhonchi. She has no rales. She exhibits no tenderness.   Abdominal: Soft. Bowel sounds are normal. She exhibits no distension and no mass. There is no tenderness. There is no rebound and no guarding.   Genitourinary:   Genitourinary Comments: No hackett in place   Musculoskeletal: Normal range of motion. She exhibits no edema or tenderness.   Lymphadenopathy:     She has no cervical adenopathy.   No leg edema   Neurological: She appears listless. She is disoriented. She displays normal reflexes. No cranial nerve deficit. She exhibits normal muscle tone. Coordination normal. GCS eye subscore is 4. GCS verbal subscore is 5. GCS motor subscore is 6.   Skin: Skin is warm and dry. No rash noted. She is not diaphoretic. No erythema. No pallor.   Diffuse erythema to forehead and bilateral periorbital areas, R>L.  Frontal left scalp with abscess present post I&D.  Multiple areas of excoriation to the scalp bilaterally.  Area of linear scabbing from a scratch posterior to abscess.  See photo.  Candida beneath breasts   Psychiatric: She has a normal mood and affect. Judgment and thought content normal. She is slowed. Cognition and memory are impaired. She is noncommunicative. She exhibits abnormal recent memory and abnormal remote memory.   Nursing note and vitals reviewed.        CRANIAL NERVES     CN III, IV, VI   Pupils are equal, round, and reactive to light.  Extraocular motions are normal.       Significant Labs:   Recent Results (from the past 24 hour(s))   CBC auto differential    Collection Time: 06/27/19  2:29 PM   Result Value Ref Range    WBC 19.03 (H) 3.90 -  12.70 K/uL    RBC 4.72 4.00 - 5.40 M/uL    Hemoglobin 14.3 12.0 - 16.0 g/dL    Hematocrit 44.4 37.0 - 48.5 %    Mean Corpuscular Volume 94 82 - 98 fL    Mean Corpuscular Hemoglobin 30.3 27.0 - 31.0 pg    Mean Corpuscular Hemoglobin Conc 32.2 32.0 - 36.0 g/dL    RDW 14.1 11.5 - 14.5 %    Platelets 212 150 - 350 K/uL    MPV 13.4 (H) 9.2 - 12.9 fL    Immature Granulocytes 0.5 0.0 - 0.5 %    Gran # (ANC) 14.3 (H) 1.8 - 7.7 K/uL    Immature Grans (Abs) 0.10 (H) 0.00 - 0.04 K/uL    Lymph # 2.4 1.0 - 4.8 K/uL    Mono # 2.0 (H) 0.3 - 1.0 K/uL    Eos # 0.1 0.0 - 0.5 K/uL    Baso # 0.08 0.00 - 0.20 K/uL    nRBC 0 0 /100 WBC    Gran% 75.2 (H) 38.0 - 73.0 %    Lymph% 12.8 (L) 18.0 - 48.0 %    Mono% 10.5 4.0 - 15.0 %    Eosinophil% 0.6 0.0 - 8.0 %    Basophil% 0.4 0.0 - 1.9 %    Differential Method Automated    Comprehensive metabolic panel    Collection Time: 06/27/19  2:29 PM   Result Value Ref Range    Sodium 139 136 - 145 mmol/L    Potassium 3.8 3.5 - 5.1 mmol/L    Chloride 99 95 - 110 mmol/L    CO2 32 (H) 23 - 29 mmol/L    Glucose 175 (H) 70 - 110 mg/dL    BUN, Bld 19 8 - 23 mg/dL    Creatinine 1.0 0.5 - 1.4 mg/dL    Calcium 10.4 8.7 - 10.5 mg/dL    Total Protein 7.3 6.0 - 8.4 g/dL    Albumin 3.2 (L) 3.5 - 5.2 g/dL    Total Bilirubin 0.4 0.1 - 1.0 mg/dL    Alkaline Phosphatase 93 55 - 135 U/L    AST 16 10 - 40 U/L    ALT 19 10 - 44 U/L    Anion Gap 8 8 - 16 mmol/L    eGFR if African American >60.0 >60 mL/min/1.73 m^2    eGFR if non  53.7 (A) >60 mL/min/1.73 m^2   Blood culture #1 **CANNOT BE ORDERED STAT**    Collection Time: 06/27/19  2:29 PM   Result Value Ref Range    Blood Culture, Routine No Growth to date    Lactic acid, plasma    Collection Time: 06/27/19  2:29 PM   Result Value Ref Range    Lactate (Lactic Acid) 2.6 (H) 0.5 - 2.2 mmol/L   Sedimentation rate    Collection Time: 06/27/19  2:29 PM   Result Value Ref Range    Sed Rate 46 (H) 0 - 36 mm/Hr   C-reactive protein    Collection Time: 06/27/19   2:29 PM   Result Value Ref Range    .8 (H) 0.0 - 8.2 mg/L   Magnesium    Collection Time: 06/27/19  2:29 PM   Result Value Ref Range    Magnesium 2.5 1.6 - 2.6 mg/dL   Blood culture #2 **CANNOT BE ORDERED STAT**    Collection Time: 06/27/19  2:30 PM   Result Value Ref Range    Blood Culture, Routine No Growth to date          Significant Imaging:   CT ORBITS SELLA POST FOSSA WITH CONTRAST    CLINICAL HISTORY:  Facial cellulitis with R pre-orbital involvement;    TECHNIQUE:  Axial images of the orbital region were obtained at 2 mm intervals following administration of 75 cc omni 350 IV contrast.  Coronal and sagittal reformatted images were reviewed.    COMPARISON:  MRI 05/04/2015    FINDINGS:  There is mild mucous membrane thickening of the maxillary sinuses noting several mucous retention cysts or polyps.  There is minimal mucous membrane thickening of the ethmoid sinuses.  The mastoid air cells are clear.  The ossicular chains are intact.  No acute displaced maxillofacial fracture.  The visualized portions of the brain parenchyma are remarkable for generalized cerebral volume loss, and sequela of chronic microvascular ischemic change.  The left vertebral artery is dominant.  The bilateral intracranial internal carotid arteries are patent.  There is essentially fetal supply of the left PCA noting some irregularity along the vessels suggesting atherosclerotic narrowing.  Additional regions of atherosclerotic narrowing are noted of the right PCA.  No focal occlusion.  No definite aneurysm.  The anterior circulation appears grossly patent.    There is soft tissue edema/induration involving the right frontal/supraorbital soft tissues.  Within this region, there is a questionable subcentimeter focus of fluid measuring 0.4 x 0.3 cm.  There is mild infraorbital right induration.  The right globe and orbital content is unremarkable.  No postseptal involvement.  No radiopaque foreign body.  Degenerative changes are  noted of the spine.      Impression       1. Periorbital edema, noting possible small right frontal fluid collection, no findings to suggest postseptal involvement.  2. Sinus disease.  3. Please see above for additional findings.      Electronically signed by: Tj Lugo MD  Date: 06/27/2019  Time: 17:07          Assessment/Plan:     Scalp abscess  -focus of fluid measuring 0.4 x 0.3 cm.  -S/P I&D in the ED with cxs sent  -Wound care and dress with Bactroban  -Consult ENT in am if abscess seems worse    Facial and periorbital cellulitis  -Periorbital edema, noting possible small right frontal fluid collection  -No findings to suggest postseptal involvement.  -cefTRIAXone injection 1 g, 1 g, Intravenous, Q12H  -vancomycin in dextrose 5 % 1 gram/250 mL IVPB 1,000 mg, 15 mg/kg, Intravenous, Q12H    Sepsis  -WBC of 19k  -NS at 100 cc/hr  -Follow up Blood CXS    Essential hypertension  -benazepril tablet 5 mg, 5 mg, Oral, QHS  -hydroCHLOROthiazide tablet 12.5 mg, 12.5 mg, Oral, Daily    Advanced Alzheimer's dementia  -memantine tablet 10 mg, 10 mg, Oral, BID  -QUEtiapine tablet 50 mg, 50 mg, Oral, QHS    Vitamin D deficiency  Osteoporosis  -calcium carbonate (OS-MARIO) tablet 500 mg, 500 mg, Oral, Daily  -vitamin D 1000 units tablet 1,000 Units, 1,000 Units, Oral, QHS    Glaucoma  -bimatoprost 0.01 % ophthalmic drops 1 drop, 1 drop, Both Eyes, Daily  -timolol maleate 0.5% ophthalmic solution 1 drop, 1 drop, Both Eyes, QAM    Hyperlipidemia  -simvastatin tablet 40 mg, 40 mg, Oral, QHS    Yeast infection  -Prone to thrush  -Candida yeast infection of intertriginous areas  -Miconazole 2% to folds  -Diflucan 100 mg po qday while on antibiotics    Chronic kidney disease, stage III  -renal dose all meds  -Consulted Pharmacy for Vanco dosing    VTE Risk Mitigation (From admission, onward)        Ordered     enoxaparin injection 40 mg  Daily      06/27/19 2045     Place JANNET hose  Until discontinued      06/27/19 2045      Place sequential compression device  Until discontinued      06/27/19 2045     IP VTE HIGH RISK PATIENT  Once      06/27/19 2045            SOCORRO Bishop MD  Department of Hospital Medicine   [facility]

## 2019-06-28 NOTE — PLAN OF CARE
06/28/19 0921   Discharge Assessment   Assessment Type Discharge Planning Assessment   Confirmed/corrected address and phone number on facesheet? Yes   Assessment information obtained from? Caregiver   Expected Length of Stay (days) 3   Communicated expected length of stay with patient/caregiver yes   Prior to hospitalization functional status: Independent   Current Functional Status: Independent   Lives With child(parveen), adult   Able to Return to Prior Arrangements yes   Is patient able to care for self after discharge? No   Patient's perception of discharge disposition home health   Readmission Within the Last 30 Days no previous admission in last 30 days   Patient currently being followed by outpatient case management? No   Equipment Currently Used at Home wheelchair   Do you have any problems affording any of your prescribed medications? No   Is the patient taking medications as prescribed? yes   Does the patient have transportation home? Yes   Transportation Anticipated family or friend will provide   Discharge Plan A Home Health   Discharge Plan B Home with family   DME Needed Upon Discharge  none   Patient/Family in Agreement with Plan yes   Met with daughter at bedside. Patient resides with daughter and she states that she has [facility] for services and would like to continue at discharge.

## 2019-06-28 NOTE — HPI
"Layla Arellano is a clinic patient of Kevin Han MD.  She has a history of advanced Alzheimer's disease, CKD3 and HTN.  She was brought by her two daughters to urgent care clinic today due to a scalp abscess for about 1 week that the patient has been picking at.  This morning the local area became red around her forehead then down her right face.  By the time she got to urgent care clinic, her eyes were swollen and crusted shut.  Currently she has one daughter with her who relates all the history.     She says that her mother has been recurrently picking with her scalp for years, scratching at times until she bleeds.  About one week ago she noticed a little boil coming up on her scalp, so she applied hot compresses.  Regardless, the patient continuously picked at the area, re-inflaming it.  The daughter awoke this morning and, "her whole face was red on the right side and her eyes were swollen shut.  She denies fever or chills.  "

## 2019-06-28 NOTE — ASSESSMENT & PLAN NOTE
-benazepril tablet 5 mg, 5 mg, Oral, QHS  -hydroCHLOROthiazide tablet 12.5 mg, 12.5 mg, Oral, Daily

## 2019-06-29 LAB
ALBUMIN SERPL BCP-MCNC: 2.3 G/DL (ref 3.5–5.2)
ALP SERPL-CCNC: 70 U/L (ref 55–135)
ALT SERPL W/O P-5'-P-CCNC: 14 U/L (ref 10–44)
ANION GAP SERPL CALC-SCNC: 10 MMOL/L (ref 8–16)
AST SERPL-CCNC: 18 U/L (ref 10–40)
BACTERIA SPEC AEROBE CULT: ABNORMAL
BASOPHILS # BLD AUTO: 0.06 K/UL (ref 0–0.2)
BASOPHILS NFR BLD: 0.4 % (ref 0–1.9)
BILIRUB SERPL-MCNC: 0.2 MG/DL (ref 0.1–1)
BUN SERPL-MCNC: 13 MG/DL (ref 8–23)
CALCIUM SERPL-MCNC: 8.3 MG/DL (ref 8.7–10.5)
CHLORIDE SERPL-SCNC: 105 MMOL/L (ref 95–110)
CO2 SERPL-SCNC: 21 MMOL/L (ref 23–29)
CREAT SERPL-MCNC: 0.7 MG/DL (ref 0.5–1.4)
DIFFERENTIAL METHOD: ABNORMAL
EOSINOPHIL # BLD AUTO: 0.5 K/UL (ref 0–0.5)
EOSINOPHIL NFR BLD: 3.8 % (ref 0–8)
ERYTHROCYTE [DISTWIDTH] IN BLOOD BY AUTOMATED COUNT: 14.1 % (ref 11.5–14.5)
EST. GFR  (AFRICAN AMERICAN): >60 ML/MIN/1.73 M^2
EST. GFR  (NON AFRICAN AMERICAN): >60 ML/MIN/1.73 M^2
GLUCOSE SERPL-MCNC: 126 MG/DL (ref 70–110)
HCT VFR BLD AUTO: 36.7 % (ref 37–48.5)
HGB BLD-MCNC: 11.9 G/DL (ref 12–16)
IMM GRANULOCYTES # BLD AUTO: 0.07 K/UL (ref 0–0.04)
IMM GRANULOCYTES NFR BLD AUTO: 0.5 % (ref 0–0.5)
LYMPHOCYTES # BLD AUTO: 2.9 K/UL (ref 1–4.8)
LYMPHOCYTES NFR BLD: 21.5 % (ref 18–48)
MAGNESIUM SERPL-MCNC: 2 MG/DL (ref 1.6–2.6)
MCH RBC QN AUTO: 30.3 PG (ref 27–31)
MCHC RBC AUTO-ENTMCNC: 32.4 G/DL (ref 32–36)
MCV RBC AUTO: 93 FL (ref 82–98)
MONOCYTES # BLD AUTO: 1.2 K/UL (ref 0.3–1)
MONOCYTES NFR BLD: 9 % (ref 4–15)
NEUTROPHILS # BLD AUTO: 8.8 K/UL (ref 1.8–7.7)
NEUTROPHILS NFR BLD: 64.8 % (ref 38–73)
NRBC BLD-RTO: 0 /100 WBC
PHOSPHATE SERPL-MCNC: 2.7 MG/DL (ref 2.7–4.5)
PLATELET # BLD AUTO: 175 K/UL (ref 150–350)
PMV BLD AUTO: 13.6 FL (ref 9.2–12.9)
POTASSIUM SERPL-SCNC: 3.6 MMOL/L (ref 3.5–5.1)
PROT SERPL-MCNC: 5.6 G/DL (ref 6–8.4)
RBC # BLD AUTO: 3.93 M/UL (ref 4–5.4)
SODIUM SERPL-SCNC: 136 MMOL/L (ref 136–145)
WBC # BLD AUTO: 13.58 K/UL (ref 3.9–12.7)

## 2019-06-29 PROCEDURE — 80053 COMPREHEN METABOLIC PANEL: CPT | Mod: HCNC

## 2019-06-29 PROCEDURE — 25000003 PHARM REV CODE 250: Mod: HCNC | Performed by: HOSPITALIST

## 2019-06-29 PROCEDURE — 85025 COMPLETE CBC W/AUTO DIFF WBC: CPT | Mod: HCNC

## 2019-06-29 PROCEDURE — 63600175 PHARM REV CODE 636 W HCPCS: Mod: HCNC | Performed by: INTERNAL MEDICINE

## 2019-06-29 PROCEDURE — 36415 COLL VENOUS BLD VENIPUNCTURE: CPT | Mod: HCNC

## 2019-06-29 PROCEDURE — 83735 ASSAY OF MAGNESIUM: CPT | Mod: HCNC

## 2019-06-29 PROCEDURE — 84100 ASSAY OF PHOSPHORUS: CPT | Mod: HCNC

## 2019-06-29 PROCEDURE — 11000001 HC ACUTE MED/SURG PRIVATE ROOM: Mod: HCNC

## 2019-06-29 PROCEDURE — 99232 SBSQ HOSP IP/OBS MODERATE 35: CPT | Mod: HCNC,,, | Performed by: HOSPITALIST

## 2019-06-29 PROCEDURE — 25000003 PHARM REV CODE 250: Mod: HCNC | Performed by: INTERNAL MEDICINE

## 2019-06-29 PROCEDURE — 63600175 PHARM REV CODE 636 W HCPCS: Mod: HCNC | Performed by: HOSPITALIST

## 2019-06-29 PROCEDURE — 99232 PR SUBSEQUENT HOSPITAL CARE,LEVL II: ICD-10-PCS | Mod: HCNC,,, | Performed by: HOSPITALIST

## 2019-06-29 RX ORDER — QUETIAPINE FUMARATE 25 MG/1
25 TABLET, FILM COATED ORAL 3 TIMES DAILY
COMMUNITY
End: 2019-07-01 | Stop reason: SDUPTHER

## 2019-06-29 RX ORDER — QUETIAPINE FUMARATE 25 MG/1
25 TABLET, FILM COATED ORAL
Status: DISCONTINUED | OUTPATIENT
Start: 2019-06-29 | End: 2019-06-30 | Stop reason: HOSPADM

## 2019-06-29 RX ADMIN — CALCIUM 500 MG: 500 TABLET ORAL at 09:06

## 2019-06-29 RX ADMIN — BACITRACIN ZINC NEOMYCIN SULFATE POLYMYXIN B SULFATE: 400; 3.5; 5 OINTMENT TOPICAL at 12:06

## 2019-06-29 RX ADMIN — VITAMIN D, TAB 1000IU (100/BT) 1000 UNITS: 25 TAB at 09:06

## 2019-06-29 RX ADMIN — QUETIAPINE FUMARATE 75 MG: 25 TABLET ORAL at 09:06

## 2019-06-29 RX ADMIN — SENNOSIDES,DOCUSATE SODIUM 1 TABLET: 8.6; 5 TABLET, FILM COATED ORAL at 09:06

## 2019-06-29 RX ADMIN — MICONAZOLE NITRATE: 20 POWDER TOPICAL at 09:06

## 2019-06-29 RX ADMIN — VANCOMYCIN HYDROCHLORIDE 1000 MG: 1 INJECTION, POWDER, LYOPHILIZED, FOR SOLUTION INTRAVENOUS at 10:06

## 2019-06-29 RX ADMIN — DONEPEZIL HYDROCHLORIDE 10 MG: 10 TABLET ORAL at 09:06

## 2019-06-29 RX ADMIN — TIMOLOL MALEATE 1 DROP: 5 SOLUTION OPHTHALMIC at 06:06

## 2019-06-29 RX ADMIN — QUETIAPINE FUMARATE 25 MG: 25 TABLET ORAL at 11:06

## 2019-06-29 RX ADMIN — HYDROCHLOROTHIAZIDE 12.5 MG: 12.5 TABLET ORAL at 09:06

## 2019-06-29 RX ADMIN — BIMATOPROST 1 DROP: 0.1 SOLUTION/ DROPS OPHTHALMIC at 12:06

## 2019-06-29 RX ADMIN — CEFTRIAXONE SODIUM 1 G: 1 INJECTION, POWDER, FOR SOLUTION INTRAMUSCULAR; INTRAVENOUS at 10:06

## 2019-06-29 RX ADMIN — BACITRACIN ZINC NEOMYCIN SULFATE POLYMYXIN B SULFATE: 400; 3.5; 5 OINTMENT TOPICAL at 09:06

## 2019-06-29 RX ADMIN — MEMANTINE 10 MG: 10 TABLET ORAL at 09:06

## 2019-06-29 RX ADMIN — BACITRACIN ZINC NEOMYCIN SULFATE POLYMYXIN B SULFATE: 400; 3.5; 5 OINTMENT TOPICAL at 10:06

## 2019-06-29 RX ADMIN — FLUCONAZOLE 100 MG: 100 TABLET ORAL at 10:06

## 2019-06-29 RX ADMIN — BENAZEPRIL HYDROCHLORIDE 5 MG: 5 TABLET ORAL at 10:06

## 2019-06-29 RX ADMIN — QUETIAPINE FUMARATE 25 MG: 25 TABLET ORAL at 05:06

## 2019-06-29 RX ADMIN — ENOXAPARIN SODIUM 40 MG: 100 INJECTION SUBCUTANEOUS at 05:06

## 2019-06-29 RX ADMIN — SIMVASTATIN 40 MG: 20 TABLET, FILM COATED ORAL at 09:06

## 2019-06-29 NOTE — ASSESSMENT & PLAN NOTE
Facial and periorbital cellulitis  Sepsis  CTH done.  Focus of fluid measuring 0.4 x 0.3 cm with No findings to suggest postseptal involvement.  S/P I&D in the ED with cxs sent.  On admission had Periorbital edema, WBC of 19k, and tachy.    WCx grew MRSA S- bactrim and vanco.  -Wound care and dress with Bactroban  -vancomycin in dextrose 5 % 1 gram/250 mL IVPB 1,000 mg, 15 mg/kg, Intravenous, Q12H  -- likely dc tomorrow with Home Health  if exam and WBC continue to improve

## 2019-06-29 NOTE — ASSESSMENT & PLAN NOTE
-memantine tablet 10 mg, 10 mg, Oral, BID  -QUEtiapine tablet 75 mg,  Oral, QHS  - seroquel tid ac per family as well

## 2019-06-29 NOTE — PROGRESS NOTES
Pharmacokinetic Assessment Follow Up: IV Vancomycin    Vancomycin serum concentration assessment(s):    The trough level was drawned correctly and can be used to guide therapy at this time.  The level was 14.8 prior to the THIRD dose.     Vancomycin Regimen Plan:    Continue regimen to Vancomycin 1000 mg IV every 12hour with next serum trough concentration measured at 0830 prior to 9am dose on 7/1    Pharmacy will continue to follow and monitor vancomycin.    Please contact pharmacy at extension [phone] for questions regarding this assessment.    Thank you for the consult,   Arline Nicholas     Patient brief summary:  Layla Arellano is a 78 y.o. female initiated on antimicrobial therapy with IV Vancomycin for treatment of suspected skin & soft tissue        Drug Allergies:   Review of patient's allergies indicates:  No Known Allergies    Actual Body Weight:   57.7 kg    Renal Function:   Estimated Creatinine Clearance: 51.9 mL/min (based on SCr of 0.7 mg/dL).,       CBC (last 72 hours):  Recent Labs   Lab Result Units 06/27/19  1429 06/28/19  0531 06/29/19  0427   WBC K/uL 19.03* 16.07* 13.58*   Hemoglobin g/dL 14.3 13.5 11.9*   Hemoglobin A1C %  --  6.8*  --    Hematocrit % 44.4 42.7 36.7*   Platelets K/uL 212 178 175   Gran% % 75.2* 71.6 64.8   Lymph% % 12.8* 16.2* 21.5   Mono% % 10.5 9.6 9.0   Eosinophil% % 0.6 1.8 3.8   Basophil% % 0.4 0.4 0.4   Differential Method  Automated Automated Automated       Metabolic Panel (last 72 hours):  Recent Labs   Lab Result Units 06/27/19  1429 06/28/19  0531 06/29/19  0427   Sodium mmol/L 139 137 136   Potassium mmol/L 3.8 3.8 3.6   Chloride mmol/L 99 104 105   CO2 mmol/L 32* 22* 21*   Glucose mg/dL 175* 148* 126*   BUN, Bld mg/dL 19 14 13   Creatinine mg/dL 1.0 0.8 0.7   Albumin g/dL 3.2* 2.7* 2.3*   Total Bilirubin mg/dL 0.4 0.5 0.2   Alkaline Phosphatase U/L 93 101 70   AST U/L 16 18 18   ALT U/L 19 14 14   Magnesium mg/dL 2.5 2.1 2.0   Phosphorus mg/dL  --  2.9 2.7        Vancomycin Administrations:  vancomycin given in the last 96 hours                   vancomycin in dextrose 5 % 1 gram/250 mL IVPB 1,000 mg (mg) 1,000 mg New Bag 06/28/19 2149     1,000 mg New Bag  0954    vancomycin in dextrose 5 % 1 gram/250 mL IVPB 1,000 mg (mg) 1,000 mg New Bag 06/27/19 1916                Drug levels (last 3 results):  Recent Labs   Lab Result Units 06/28/19  2142   Vancomycin-Trough ug/mL 14.8       Microbiologic Results:  Microbiology Results (last 7 days)     Procedure Component Value Units Date/Time    Blood culture #1 **CANNOT BE ORDERED STAT** [[id number]] Collected:  06/27/19 1429    Order Status:  Completed Specimen:  Blood from Peripheral, Antecubital, Left Updated:  06/28/19 1612     Blood Culture, Routine No Growth to date      No Growth to date    Blood culture #2 **CANNOT BE ORDERED STAT** [[id number]] Collected:  06/27/19 1430    Order Status:  Completed Specimen:  Blood from Peripheral, Forearm, Left Updated:  06/28/19 1612     Blood Culture, Routine No Growth to date      No Growth to date    Aerobic culture [[id number]]  (Abnormal) Collected:  06/27/19 1741    Order Status:  Completed Specimen:  Abscess from Face Updated:  06/28/19 0947     Aerobic Bacterial Culture STAPHYLOCOCCUS AUREUS  Many  Susceptibility pending

## 2019-06-29 NOTE — PROGRESS NOTES
"[facility] Hospital Medicine  Progress Note    Patient Name: Layla Arellano  MRN: [id number]  Patient Class: IP- Inpatient   Admission Date: 6/27/2019  Length of Stay: 2 days  Attending Physician: Keo Soto MD  Primary Care Provider: Kevin Han MD    [facility] Medicine Team: [facility] MED  Keo Soto MD    Subjective:     Principal Problem:Scalp abscess      HPI:  Layla Arellano is a clinic patient of Kevin Han MD.  She has a history of advanced Alzheimer's disease, CKD3 and HTN.  She was brought by her two daughters to urgent care clinic today due to a scalp abscess for about 1 week that the patient has been picking at.  This morning the local area became red around her forehead then down her right face.  By the time she got to urgent care clinic, her eyes were swollen and crusted shut.  Currently she has one daughter with her who relates all the history.     She says that her mother has been recurrently picking with her scalp for years, scratching at times until she bleeds.  About one week ago she noticed a little boil coming up on her scalp, so she applied hot compresses.  Regardless, the patient continuously picked at the area, re-inflaming it.  The daughter awoke this morning and, "her whole face was red on the right side and her eyes were swollen shut.  She denies fever or chills.    Overview/Hospital Course:  See problem list    Interval History:   Symptoms:  Improved swelling.    Diet:  Adequate intake.    Activity level:  Impaired due to chronic confusion.  Pain:  No pain.       Review of Systems   Constitutional: Negative for fever.   Respiratory: Negative for shortness of breath.    Cardiovascular: Negative for chest pain.   Gastrointestinal: Negative for abdominal pain.   Psychiatric/Behavioral: Positive for confusion. Negative for agitation.     Objective:     Vital Signs (Most Recent):  Temp: 99.4 °F (37.4 °C) (06/29/19 1300)  Pulse: 98 (06/29/19 1300)  Resp: 16 " (06/29/19 1300)  BP: 132/60 (06/29/19 1300)  SpO2: (!) 93 % (06/29/19 1300) Vital Signs (24h Range):  Temp:  [97.1 °F (36.2 °C)-99.4 °F (37.4 °C)] 99.4 °F (37.4 °C)  Pulse:  [] 98  Resp:  [16-18] 16  SpO2:  [93 %-95 %] 93 %  BP: (104-137)/(58-70) 132/60     Weight: 57.7 kg (127 lb 3.2 oz)  Body mass index is 24.84 kg/m².    Intake/Output Summary (Last 24 hours) at 6/29/2019 1306  Last data filed at 6/29/2019 0600  Gross per 24 hour   Intake 740 ml   Output --   Net 740 ml      Physical Exam   Constitutional: No distress.   Eyes: Right eye exhibits no discharge. Left eye exhibits no discharge.   Neck: No JVD present.   Cardiovascular: Normal rate, regular rhythm and normal heart sounds.   Pulmonary/Chest: Effort normal and breath sounds normal. No respiratory distress.   Abdominal: Soft. Bowel sounds are normal. She exhibits no distension.   Musculoskeletal: She exhibits no edema.   Neurological: She is alert.   Not conversant.   Skin: Skin is warm and dry.   RU forehead lesion has scab.  Erythema receded to forehead.  No edema in lids.       Significant Labs: All pertinent labs within the past 24 hours have been reviewed.    Significant Imaging: I have reviewed and interpreted all pertinent imaging results/findings within the past 24 hours.      Assessment/Plan:      * Scalp abscess  Facial and periorbital cellulitis  Sepsis  CTH done.  Focus of fluid measuring 0.4 x 0.3 cm with No findings to suggest postseptal involvement.  S/P I&D in the ED with cxs sent.  On admission had Periorbital edema, WBC of 19k, and tachy.    WCx grew MRSA S- bactrim and vanco.  -Wound care and dress with Bactroban  -vancomycin in dextrose 5 % 1 gram/250 mL IVPB 1,000 mg, 15 mg/kg, Intravenous, Q12H  -- likely dc tomorrow with Home Health  if exam and WBC continue to improve        Neurodegenerative dementia without behavioral disturbance  -memantine tablet 10 mg, 10 mg, Oral, BID  -QUEtiapine tablet 75 mg,  Oral, QHS  - seroquel  tid ac per family as well      CKD (chronic kidney disease) stage 3, GFR 30-59 ml/min  -renal dose all meds  -Consulted Pharmacy for Vanco dosing      Essential hypertension  -benazepril tablet 5 mg, 5 mg, Oral, QHS  -hydroCHLOROthiazide tablet 12.5 mg, 12.5 mg, Oral, Daily        VTE Risk Mitigation (From admission, onward)        Ordered     enoxaparin injection 40 mg  Daily      06/27/19 2045     Place JANNET hose  Until discontinued      06/27/19 2045     Place sequential compression device  Until discontinued      06/27/19 2045     IP VTE HIGH RISK PATIENT  Once      06/27/19 2045                Keo Soto MD  Department of Hospital Medicine   [facility]

## 2019-06-29 NOTE — SUBJECTIVE & OBJECTIVE
Interval History:   Symptoms:  Improved swelling.    Diet:  Adequate intake.    Activity level:  Impaired due to chronic confusion.  Pain:  No pain.       Review of Systems   Constitutional: Negative for fever.   Respiratory: Negative for shortness of breath.    Cardiovascular: Negative for chest pain.   Gastrointestinal: Negative for abdominal pain.   Psychiatric/Behavioral: Positive for confusion. Negative for agitation.     Objective:     Vital Signs (Most Recent):  Temp: 99.4 °F (37.4 °C) (06/29/19 1300)  Pulse: 98 (06/29/19 1300)  Resp: 16 (06/29/19 1300)  BP: 132/60 (06/29/19 1300)  SpO2: (!) 93 % (06/29/19 1300) Vital Signs (24h Range):  Temp:  [97.1 °F (36.2 °C)-99.4 °F (37.4 °C)] 99.4 °F (37.4 °C)  Pulse:  [] 98  Resp:  [16-18] 16  SpO2:  [93 %-95 %] 93 %  BP: (104-137)/(58-70) 132/60     Weight: 57.7 kg (127 lb 3.2 oz)  Body mass index is 24.84 kg/m².    Intake/Output Summary (Last 24 hours) at 6/29/2019 1306  Last data filed at 6/29/2019 0600  Gross per 24 hour   Intake 740 ml   Output --   Net 740 ml      Physical Exam   Constitutional: No distress.   Eyes: Right eye exhibits no discharge. Left eye exhibits no discharge.   Neck: No JVD present.   Cardiovascular: Normal rate, regular rhythm and normal heart sounds.   Pulmonary/Chest: Effort normal and breath sounds normal. No respiratory distress.   Abdominal: Soft. Bowel sounds are normal. She exhibits no distension.   Musculoskeletal: She exhibits no edema.   Neurological: She is alert.   Not conversant.   Skin: Skin is warm and dry.   RU forehead lesion has scab.  Erythema receded to forehead.  No edema in lids.       Significant Labs: All pertinent labs within the past 24 hours have been reviewed.    Significant Imaging: I have reviewed and interpreted all pertinent imaging results/findings within the past 24 hours.

## 2019-06-29 NOTE — PLAN OF CARE
Problem: Adult Inpatient Plan of Care  Goal: Plan of Care Review  Outcome: Ongoing (interventions implemented as appropriate)  Patient alert and non verbal, daughters remain at bedside and assist with care. Patient tolerated iv antibiotics well. Vital signs stable. Patient anxious at times and prn seroquel ordered for her and she did receive a dose today . Did relax. Ate well and did have one episode of vomiting , daughter stated she fed her too much . No fall or occurrence noted. Wound care did assess her and new directions noted for wound care. Turned frequently.  Continues with some facial redness.  Report to on coming nurse . Assessment on going.

## 2019-06-30 VITALS
HEIGHT: 60 IN | TEMPERATURE: 98 F | HEART RATE: 86 BPM | OXYGEN SATURATION: 93 % | SYSTOLIC BLOOD PRESSURE: 120 MMHG | WEIGHT: 135.13 LBS | RESPIRATION RATE: 16 BRPM | DIASTOLIC BLOOD PRESSURE: 70 MMHG | BODY MASS INDEX: 26.53 KG/M2

## 2019-06-30 LAB
ALBUMIN SERPL BCP-MCNC: 2.4 G/DL (ref 3.5–5.2)
ALP SERPL-CCNC: 72 U/L (ref 55–135)
ALT SERPL W/O P-5'-P-CCNC: 14 U/L (ref 10–44)
ANION GAP SERPL CALC-SCNC: 10 MMOL/L (ref 8–16)
AST SERPL-CCNC: 14 U/L (ref 10–40)
BASOPHILS # BLD AUTO: 0.05 K/UL (ref 0–0.2)
BASOPHILS NFR BLD: 0.5 % (ref 0–1.9)
BILIRUB SERPL-MCNC: 0.2 MG/DL (ref 0.1–1)
BUN SERPL-MCNC: 8 MG/DL (ref 8–23)
CALCIUM SERPL-MCNC: 8.8 MG/DL (ref 8.7–10.5)
CHLORIDE SERPL-SCNC: 107 MMOL/L (ref 95–110)
CO2 SERPL-SCNC: 24 MMOL/L (ref 23–29)
CREAT SERPL-MCNC: 0.7 MG/DL (ref 0.5–1.4)
DIFFERENTIAL METHOD: ABNORMAL
EOSINOPHIL # BLD AUTO: 0.4 K/UL (ref 0–0.5)
EOSINOPHIL NFR BLD: 4 % (ref 0–8)
ERYTHROCYTE [DISTWIDTH] IN BLOOD BY AUTOMATED COUNT: 13.7 % (ref 11.5–14.5)
EST. GFR  (AFRICAN AMERICAN): >60 ML/MIN/1.73 M^2
EST. GFR  (NON AFRICAN AMERICAN): >60 ML/MIN/1.73 M^2
GLUCOSE SERPL-MCNC: 129 MG/DL (ref 70–110)
HCT VFR BLD AUTO: 37.9 % (ref 37–48.5)
HGB BLD-MCNC: 12 G/DL (ref 12–16)
IMM GRANULOCYTES # BLD AUTO: 0.07 K/UL (ref 0–0.04)
IMM GRANULOCYTES NFR BLD AUTO: 0.6 % (ref 0–0.5)
LYMPHOCYTES # BLD AUTO: 2.4 K/UL (ref 1–4.8)
LYMPHOCYTES NFR BLD: 22.5 % (ref 18–48)
MAGNESIUM SERPL-MCNC: 2 MG/DL (ref 1.6–2.6)
MCH RBC QN AUTO: 29.9 PG (ref 27–31)
MCHC RBC AUTO-ENTMCNC: 31.7 G/DL (ref 32–36)
MCV RBC AUTO: 95 FL (ref 82–98)
MONOCYTES # BLD AUTO: 1.1 K/UL (ref 0.3–1)
MONOCYTES NFR BLD: 9.7 % (ref 4–15)
NEUTROPHILS # BLD AUTO: 6.8 K/UL (ref 1.8–7.7)
NEUTROPHILS NFR BLD: 62.7 % (ref 38–73)
NRBC BLD-RTO: 0 /100 WBC
PHOSPHATE SERPL-MCNC: 3.3 MG/DL (ref 2.7–4.5)
PLATELET # BLD AUTO: 217 K/UL (ref 150–350)
PMV BLD AUTO: 13.2 FL (ref 9.2–12.9)
POTASSIUM SERPL-SCNC: 3.7 MMOL/L (ref 3.5–5.1)
PROT SERPL-MCNC: 5.7 G/DL (ref 6–8.4)
RBC # BLD AUTO: 4.01 M/UL (ref 4–5.4)
SODIUM SERPL-SCNC: 141 MMOL/L (ref 136–145)
WBC # BLD AUTO: 10.85 K/UL (ref 3.9–12.7)

## 2019-06-30 PROCEDURE — 85025 COMPLETE CBC W/AUTO DIFF WBC: CPT | Mod: HCNC

## 2019-06-30 PROCEDURE — 84100 ASSAY OF PHOSPHORUS: CPT | Mod: HCNC

## 2019-06-30 PROCEDURE — 25000003 PHARM REV CODE 250: Mod: HCNC | Performed by: INTERNAL MEDICINE

## 2019-06-30 PROCEDURE — 99239 PR HOSPITAL DISCHARGE DAY,>30 MIN: ICD-10-PCS | Mod: HCNC,,, | Performed by: HOSPITALIST

## 2019-06-30 PROCEDURE — 36415 COLL VENOUS BLD VENIPUNCTURE: CPT | Mod: HCNC

## 2019-06-30 PROCEDURE — 80053 COMPREHEN METABOLIC PANEL: CPT | Mod: HCNC

## 2019-06-30 PROCEDURE — 99239 HOSP IP/OBS DSCHRG MGMT >30: CPT | Mod: HCNC,,, | Performed by: HOSPITALIST

## 2019-06-30 PROCEDURE — 25000003 PHARM REV CODE 250: Mod: HCNC | Performed by: HOSPITALIST

## 2019-06-30 PROCEDURE — 63600175 PHARM REV CODE 636 W HCPCS: Mod: HCNC | Performed by: INTERNAL MEDICINE

## 2019-06-30 PROCEDURE — 83735 ASSAY OF MAGNESIUM: CPT | Mod: HCNC

## 2019-06-30 RX ORDER — LACTULOSE 10 G/15ML
10 SOLUTION ORAL; RECTAL DAILY
COMMUNITY
End: 2019-07-15 | Stop reason: SDUPTHER

## 2019-06-30 RX ORDER — SULFAMETHOXAZOLE AND TRIMETHOPRIM 800; 160 MG/1; MG/1
1 TABLET ORAL 2 TIMES DAILY
Qty: 10 TABLET | Refills: 0 | Status: SHIPPED | OUTPATIENT
Start: 2019-06-30 | End: 2019-07-15

## 2019-06-30 RX ORDER — SULFAMETHOXAZOLE AND TRIMETHOPRIM 800; 160 MG/1; MG/1
1 TABLET ORAL 2 TIMES DAILY
Qty: 10 TABLET | Refills: 0 | Status: SHIPPED | OUTPATIENT
Start: 2019-06-30 | End: 2019-06-30 | Stop reason: HOSPADM

## 2019-06-30 RX ADMIN — MICONAZOLE NITRATE: 20 POWDER TOPICAL at 09:06

## 2019-06-30 RX ADMIN — HYDROCHLOROTHIAZIDE 12.5 MG: 12.5 TABLET ORAL at 08:06

## 2019-06-30 RX ADMIN — BACITRACIN ZINC NEOMYCIN SULFATE POLYMYXIN B SULFATE: 400; 3.5; 5 OINTMENT TOPICAL at 08:06

## 2019-06-30 RX ADMIN — DONEPEZIL HYDROCHLORIDE 10 MG: 10 TABLET ORAL at 08:06

## 2019-06-30 RX ADMIN — BIMATOPROST 1 DROP: 0.1 SOLUTION/ DROPS OPHTHALMIC at 08:06

## 2019-06-30 RX ADMIN — QUETIAPINE FUMARATE 25 MG: 25 TABLET ORAL at 11:06

## 2019-06-30 RX ADMIN — MEMANTINE 10 MG: 10 TABLET ORAL at 08:06

## 2019-06-30 RX ADMIN — SENNOSIDES,DOCUSATE SODIUM 1 TABLET: 8.6; 5 TABLET, FILM COATED ORAL at 08:06

## 2019-06-30 RX ADMIN — FLUCONAZOLE 100 MG: 100 TABLET ORAL at 09:06

## 2019-06-30 RX ADMIN — QUETIAPINE FUMARATE 25 MG: 25 TABLET ORAL at 06:06

## 2019-06-30 RX ADMIN — CALCIUM 500 MG: 500 TABLET ORAL at 08:06

## 2019-06-30 RX ADMIN — TIMOLOL MALEATE 1 DROP: 5 SOLUTION OPHTHALMIC at 06:06

## 2019-06-30 RX ADMIN — VANCOMYCIN HYDROCHLORIDE 1000 MG: 1 INJECTION, POWDER, LYOPHILIZED, FOR SOLUTION INTRAVENOUS at 08:06

## 2019-06-30 NOTE — PROGRESS NOTES
AVS d/c instructions given to patient and daughters, voices understanding. PIV d/catrachita, gauze dressing applied. Diaper changed. Waiting on hospital w/c transport.

## 2019-06-30 NOTE — PLAN OF CARE
Problem: Adult Inpatient Plan of Care  Goal: Plan of Care Review  Outcome: Ongoing (interventions implemented as appropriate)  Sleep without any problem after taking seroquel . VSS. No falls/ bed place in low position call light in reach. Daughter has remain at bedside and participating with care to her mother. Fidgety with her aroldo attempting to remover her gown and keeps picking at dressing to RT forehead. No complain voiced by daughter.

## 2019-06-30 NOTE — ASSESSMENT & PLAN NOTE
Facial and periorbital cellulitis  Sepsis  CTH done.  Focus of fluid measuring 0.4 x 0.3 cm with No findings to suggest postseptal involvement.  S/P I&D in the ED with cxs sent.  On admission had Periorbital edema, WBC of 19k, and tachy.    WCx grew MRSA S- bactrim and vanco.  -Wound care and dress with Bactroban which the patient pulled off daily.  -vancomycin given with great improvement or edema and redness.  WBC WNL at [place].

## 2019-06-30 NOTE — DISCHARGE SUMMARY
"[facility] Hospital Medicine  Discharge Summary      Patient Name: Layla Arellano  MRN: [id number]  Admission Date: 6/27/2019  Hospital Length of Stay: 3 days  Discharge Date and Time:  06/30/2019 8:45 AM  Attending Physician: Keo Nelson MD   Discharging Provider: Keo Nelson MD  Primary Care Provider: Kevin Han MD  [facility] Medicine Team: [facility]  Keo Nelson MD    HPI:   Layla Arellano is a clinic patient of Kevin Han MD.  She has a history of advanced Alzheimer's disease, CKD3 and HTN.  She was brought by her two daughters to urgent care clinic today due to a scalp abscess for about 1 week that the patient has been picking at.  This morning the local area became red around her forehead then down her right face.  By the time she got to urgent care clinic, her eyes were swollen and crusted shut.  Currently she has one daughter with her who relates all the history.     She says that her mother has been recurrently picking with her scalp for years, scratching at times until she bleeds.  About one week ago she noticed a little boil coming up on her scalp, so she applied hot compresses.  Regardless, the patient continuously picked at the area, re-inflaming it.  The daughter awoke this morning and, "her whole face was red on the right side and her eyes were swollen shut.  She denies fever or chills.    * No surgery found *      Hospital Course:   See problem list     Consults:   Consults (From admission, onward)        Status Ordering Provider     IP consult to case management  Once     Provider:  (Not yet assigned)    Acknowledged KEO NELSON     Pharmacy to dose Vancomycin consult  Once     Provider:  (Not yet assigned)    Acknowledged PARMJIT HAM          * Scalp abscess  Facial and periorbital cellulitis  Sepsis  CTH done.  Focus of fluid measuring 0.4 x 0.3 cm with No findings to suggest postseptal involvement.  S/P I&D in the ED with cxs sent.  On " admission had Periorbital edema, WBC of 19k, and tachy.    WCx grew MRSA S- bactrim and vanco.  -Wound care and dress with Bactroban which the patient pulled off daily.  -vancomycin given with great improvement or edema and redness.  WBC WNL at DC.            Final Active Diagnoses:    Diagnosis Date Noted POA    PRINCIPAL PROBLEM:  Scalp abscess [L02.811] 06/28/2019 Yes    Neurodegenerative dementia without behavioral disturbance [F03.90] 06/12/2015 Yes    CKD (chronic kidney disease) stage 3, GFR 30-59 ml/min [N18.3] 06/12/2015 Yes    Essential hypertension [I10] 06/12/2015 Yes    Sepsis [A41.9] 06/28/2019 Yes    Abscess [L02.91] 06/28/2019 Yes    Facial cellulitis [L03.211] 06/27/2019 Yes      Problems Resolved During this Admission:       Discharged Condition: good    Disposition: Home-Health Care [facility]    Follow Up:  Follow-up Information     Kevin Han MD On 7/15/2019.    Specialty:  Internal Medicine  Why:  appointment 11:00am  Contact information:  [address]  [facility] [address]  [phone]                 Patient Instructions:      Diet Adult Regular     Notify your health care provider if you experience any of the following:  temperature >100.4     Notify your health care provider if you experience any of the following:  redness, tenderness, or signs of infection (pain, swelling, redness, odor or green/yellow discharge around incision site)     Change dressing (specify)   Order Comments: Dressing change: wash with soap and water daily.  May keep lightly covered with a bandage.     Activity as tolerated           Pending Diagnostic Studies:     Procedure Component Value Units Date/Time    Antistreptolysin O titer [[id number]] Collected:  06/28/19 0531    Order Status:  Sent Lab Status:  In process Updated:  06/28/19 0712    Specimen:  Blood          Medications:  Reconciled Home Medications:      Medication List      START taking these medications    neomycin-bacitracin-polymyxin  ointment  Commonly known as:  NEOSPORIN  Apply topically 3 (three) times daily.     sulfamethoxazole-trimethoprim 800-160mg 800-160 mg Tab  Commonly known as:  BACTRIM DS  Take 1 tablet by mouth 2 (two) times daily.        CONTINUE taking these medications    alendronate 70 MG tablet  Commonly known as:  FOSAMAX  TAKE 1 TABLET EVERY 7 DAYS. SEE PACKAGE FOR ADDITIONAL INSTRUCTIONS     benazepril 5 MG tablet  Commonly known as:  LOTENSIN  TAKE 1 TABLET (5 MG TOTAL) BY MOUTH EVERY EVENING.     bimatoprost 0.01 % Drop  Commonly known as:  LUMIGAN  Place 1 drop into both eyes once daily.     calcium carbonate 500 mg calcium (1,250 mg) tablet  Commonly known as:  OS-MARIO  Take 1 tablet by mouth once daily.     donepezil 10 MG tablet  Commonly known as:  ARICEPT  TAKE 1 TABLET EVERY DAY     hydroCHLOROthiazide 12.5 MG Tab  Commonly known as:  HYDRODIURIL  TAKE 1 TABLET EVERY DAY     memantine 10 MG Tab  Commonly known as:  NAMENDA  TAKE 1 TABLET TWICE DAILY     OCUVITE ORAL  Take by mouth once daily.     * SEROquel 50 MG tablet  Generic drug:  QUEtiapine  Take 75 mg by mouth nightly.     * QUEtiapine 25 MG Tab  Commonly known as:  SEROQUEL  Take 25 mg by mouth 3 (three) times daily.     simvastatin 40 MG tablet  Commonly known as:  ZOCOR  TAKE 1 TABLET EVERY EVENING     timolol maleate 0.5% 0.5 % Drop  Commonly known as:  TIMOPTIC  INSTILL 1 DROP INTO BOTH EYES EVERY MORNING     vitamin D 1000 units Tab  Commonly known as:  VITAMIN D3  Take 185 mg by mouth every evening.         * This list has 2 medication(s) that are the same as other medications prescribed for you. Read the directions carefully, and ask your doctor or other care provider to review them with you.            STOP taking these medications    DUKE'S SOLUTION     lactulose 10 gram packet  Commonly known as:  CEPHULAC     nystatin-triamcinolone ointment  Commonly known as:  MYCOLOG            Indwelling Lines/Drains at time of discharge:   Lines/Drains/Airways           None          Time spent on the discharge of patient: 33 minutes  Patient was seen and examined on the date of discharge and determined to be suitable for discharge.         Keo Soto MD  Department of Hospital Medicine  [facility]

## 2019-06-30 NOTE — PLAN OF CARE
Problem: Skin Injury Risk Increased  Goal: Skin Health and Integrity  Outcome: Unable to achieve outcome(s) by discharge  AVS d/c instructions;given refer to

## 2019-06-30 NOTE — PLAN OF CARE
[facility]    HOME HEALTH ORDERS  FACE TO FACE ENCOUNTER    Patient Name: Layla Arellano  YOB: 1940    PCP: Kevin Han MD   [address] [address] [place] [place] [place] [address]  PCP Phone Number: [phone]  PCP Fax: [phone]    Encounter Date: 06/30/2019    Admit to Home Health    Diagnoses:  Active Hospital Problems    Diagnosis  POA    *Scalp abscess [L02.811]  Yes     Priority: 1 - High    Neurodegenerative dementia without behavioral disturbance [F03.90]  Yes     Priority: 2     CKD (chronic kidney disease) stage 3, GFR 30-59 ml/min [N18.3]  Yes     Priority: 3     Essential hypertension [I10]  Yes     Priority: 3     Sepsis [A41.9]  Yes    Abscess [L02.91]  Yes    Facial cellulitis [L03.211]  Yes      Resolved Hospital Problems   No resolved problems to display.       Future Appointments   Date Time Provider Department Center   7/15/2019 11:00 AM Kevin Han MD [facility]     Follow-up Information     Kevin Han MD On 7/15/2019.    Specialty:  Internal Medicine  Why:  appointment 11:00am  Contact information:  [address]  [facility] [address]  [phone]                     I have seen and examined this patient face to face today. My clinical findings that support the need for the home health skilled services and home bound status are the following:  Weakness/numbness causing balance and gait disturbance due to Weakness/Debility making it taxing to leave home.  Requiring assistive device to leave home due to unsteady gait caused by  Weakness/Debility.    Allergies:Review of patient's allergies indicates:  No Known Allergies    Diet: regular diet    Activities: activity as tolerated    Nursing:   SN to complete comprehensive assessment including routine vital signs. Instruct on disease process and s/s of complications to report to MD. Review/verify medication list sent home with the patient at time of discharge  and instruct  patient/caregiver as needed. Frequency may be adjusted depending on start of care date.    Notify MD if SBP > 160 or < 90; DBP > 90 or < 50; HR > 120 or < 50; Temp > 101;      CONSULTS:     to evaluate for community resources/long-range planning.  Aide to provide assistance with personal care, ADLs, and vital signs.    MISCELLANEOUS CARE:  Routine Skin for Bedridden Patients: Instruct patient/caregiver to apply moisture barrier cream to all skin folds and wet areas in perineal area daily and after baths and all bowel movements.    WOUND CARE ORDERS  n/a      Medications: Review discharge medications with patient and family and provide education.      Current Discharge Medication List      START taking these medications    Details   neomycin-bacitracin-polymyxin (NEOSPORIN) ointment Apply topically 3 (three) times daily.  Refills: 0      sulfamethoxazole-trimethoprim 800-160mg (BACTRIM DS) 800-160 mg Tab Take 1 tablet by mouth 2 (two) times daily.  Qty: 10 tablet, Refills: 0         CONTINUE these medications which have NOT CHANGED    Details   benazepril (LOTENSIN) 5 MG tablet TAKE 1 TABLET (5 MG TOTAL) BY MOUTH EVERY EVENING.  Qty: 90 tablet, Refills: 3      BETA-CAROTENE,A, W-C & E/MIN (OCUVITE ORAL) Take by mouth once daily.       bimatoprost (LUMIGAN) 0.01 % Drop Place 1 drop into both eyes once daily.  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      donepezil (ARICEPT) 10 MG tablet TAKE 1 TABLET EVERY DAY  Qty: 90 tablet, Refills: 3      hydroCHLOROthiazide (HYDRODIURIL) 12.5 MG Tab TAKE 1 TABLET EVERY DAY  Qty: 90 tablet, Refills: 3      memantine (NAMENDA) 10 MG Tab TAKE 1 TABLET TWICE DAILY  Qty: 180 tablet, Refills: 3      !! QUEtiapine (SEROQUEL) 25 MG Tab Take 25 mg by mouth 3 (three) times daily.      !! QUEtiapine (SEROQUEL) 50 MG tablet Take 75  mg by mouth nightly.      simvastatin (ZOCOR) 40 MG tablet TAKE 1 TABLET EVERY EVENING  Qty: 90 tablet, Refills: 3    Associated Diagnoses: Hyperlipidemia, unspecified hyperlipidemia type      timolol maleate 0.5% (TIMOPTIC) 0.5 % Drop INSTILL 1 DROP INTO BOTH EYES EVERY MORNING  Qty: 15 mL, Refills: 3      vitamin D 1000 units Tab Take 185 mg by mouth every evening.       alendronate (FOSAMAX) 70 MG tablet TAKE 1 TABLET EVERY 7 DAYS. SEE PACKAGE FOR ADDITIONAL INSTRUCTIONS  Qty: 12 tablet, Refills: 3       !! - Potential duplicate medications found. Please discuss with provider.      STOP taking these medications       lactulose (CEPHULAC) 10 gram packet Comments:   Reason for Stopping:         NYSTATIN (DUKE'S SOLUTION) Comments:   Reason for Stopping:         nystatin-triamcinolone (MYCOLOG) ointment Comments:   Reason for Stopping:         nystatin-triamcinolone (MYCOLOG) ointment Comments:   Reason for Stopping:               I certify that this patient is confined to her home and needs intermittent skilled nursing care.

## 2019-06-30 NOTE — PLAN OF CARE
CM updated by pt's nurse that pt w/ d/c orders that include h/h - pt/dgtr request [facility]. CM forwarded h/h orders to [facility] via [facility]. CM spoke w/ Ceci, on call w/ [facility] and they will f/u w/ pt.       06/30/19 1540   Final Note   Assessment Type Final Discharge Note   Anticipated Discharge Disposition Home-Health   What phone number can be called within the next 1-3 days to see how you are doing after discharge? [phone]   Right [facility] Referral Info   Post Acute Recommendation Home-care   Referral Type home health   Facility Name [facility]

## 2019-06-30 NOTE — PLAN OF CARE
Problem: Wound  Goal: Optimal Wound Healing  Outcome: Unable to achieve outcome(s) by discharge  instructed to continue wound care per orders, refer to AVS d/c form

## 2019-07-01 LAB — ASO AB SERPL-ACNC: 24 IU/ML

## 2019-07-01 RX ORDER — QUETIAPINE FUMARATE 25 MG/1
25 TABLET, FILM COATED ORAL 3 TIMES DAILY
Qty: 90 TABLET | Refills: 11 | Status: SHIPPED | OUTPATIENT
Start: 2019-07-01 | End: 2019-08-26 | Stop reason: SDUPTHER

## 2019-07-01 NOTE — TELEPHONE ENCOUNTER
----- Message from Vera Almanza sent at 7/1/2019  8:15 AM CDT -----  Contact: DaughterAmy [phone]  Is this a refill or new RX:  Refill     RX name and strength: QUEtiapine (SEROQUEL) 25 MG Tab      Pharmacy name and phone # [id number] - [place] [address] [address]   [phone] (Phone) [phone] (Fax)

## 2019-07-02 ENCOUNTER — PATIENT OUTREACH (OUTPATIENT)
Dept: ADMINISTRATIVE | Facility: HOSPITAL | Age: 79
End: 2019-07-02

## 2019-07-02 DIAGNOSIS — Z78.0 POSTMENOPAUSAL: Primary | ICD-10-CM

## 2019-07-02 LAB
BACTERIA BLD CULT: NORMAL
BACTERIA BLD CULT: NORMAL

## 2019-07-02 NOTE — PROGRESS NOTES
Health Maintenance Due   Topic Date Due    DEXA SCAN  04/12/2018    Lipid Panel  11/13/2018     Shingles vaccine due.    Pre-visit chart check complete.

## 2019-07-02 NOTE — PHYSICIAN QUERY
"PT Name: Layla Arellano  MR #: [id number]    Physician Query Form - Cause and Effect Relationship Clarification      CDS: Kimberly Spencer RN, CCDS         Contact information :ext [phone])  [email or web address]       This form is a permanent document in the medical record.     Query Date: July 1, 2019    By submitting this query, we are merely seeking further clarification of documentation. Please utilize your independent clinical judgment when addressing the question(s) below.    The Medical record contains the following:  Supporting Clinical Findings   Location in record       "Sepsis  -WBC of 19k  -NS at 100 cc/hr  -Follow up Blood CXS"      "Scalp abscess  -focus of fluid measuring 0.4 x 0.3 cm.  -S/P I&D in the ED with cxs sent"    "cefTRIAXone injection 1 g, 1 g, Intravenous, Q12H  -vancomycin in dextrose 5 % 1 gram/250 mL IVPB 1,000 mg, 15 mg/kg, Intravenous, Q12H"                                                              Culture facial abscess    METHICILLIN RESISTANT STAPHYLOCOCCUS AUREUS                                                                                                                      H&P 6/27/19                H&P 6/27/19            Lab 6/27/19                                    Doctor, please clarify if there is any correlation between  Sepsis and METHICILLIN RESISTANT STAPHYLOCOCCUS AUREUS.           Are the conditions:      [  x ] Due to or associated with each other   [   ] Unrelated to each other   [   ] Other (Please Specify): _________________________   [  ] Clinically Undetermined                                                                                                                                                                                             "

## 2019-07-04 NOTE — ED TRIAGE NOTES
Pt has cellulitis to right head, redness and swelling noted. Pt is very confused.   JESSEE Marino / [facility]

## 2019-07-13 PROCEDURE — G0179 MD RECERTIFICATION HHA PT: HCPCS | Mod: ,,, | Performed by: INTERNAL MEDICINE

## 2019-07-13 PROCEDURE — G0179 PR HOME HEALTH MD RECERTIFICATION: ICD-10-PCS | Mod: ,,, | Performed by: INTERNAL MEDICINE

## 2019-07-15 ENCOUNTER — LAB VISIT (OUTPATIENT)
Dept: LAB | Facility: HOSPITAL | Age: 79
End: 2019-07-15
Attending: INTERNAL MEDICINE
Payer: MEDICARE

## 2019-07-15 ENCOUNTER — OFFICE VISIT (OUTPATIENT)
Dept: INTERNAL MEDICINE | Facility: CLINIC | Age: 79
End: 2019-07-15
Payer: MEDICARE

## 2019-07-15 ENCOUNTER — RESEARCH ENCOUNTER (OUTPATIENT)
Dept: RESEARCH | Facility: HOSPITAL | Age: 79
End: 2019-07-15

## 2019-07-15 VITALS
OXYGEN SATURATION: 95 % | BODY MASS INDEX: 26.39 KG/M2 | DIASTOLIC BLOOD PRESSURE: 60 MMHG | HEIGHT: 60 IN | SYSTOLIC BLOOD PRESSURE: 108 MMHG | HEART RATE: 73 BPM | TEMPERATURE: 100 F

## 2019-07-15 DIAGNOSIS — E78.5 HYPERLIPIDEMIA, UNSPECIFIED HYPERLIPIDEMIA TYPE: ICD-10-CM

## 2019-07-15 DIAGNOSIS — R73.09 ELEVATED GLUCOSE: Primary | ICD-10-CM

## 2019-07-15 DIAGNOSIS — L03.211 FACIAL CELLULITIS: ICD-10-CM

## 2019-07-15 DIAGNOSIS — Z00.00 ANNUAL PHYSICAL EXAM: ICD-10-CM

## 2019-07-15 DIAGNOSIS — R73.09 ELEVATED GLUCOSE: ICD-10-CM

## 2019-07-15 DIAGNOSIS — Z00.00 ANNUAL PHYSICAL EXAM: Primary | ICD-10-CM

## 2019-07-15 DIAGNOSIS — F03.918 NEURODEGENERATIVE DEMENTIA WITH BEHAVIORAL DISTURBANCE: ICD-10-CM

## 2019-07-15 DIAGNOSIS — I10 ESSENTIAL HYPERTENSION: ICD-10-CM

## 2019-07-15 DIAGNOSIS — R32 URINARY INCONTINENCE, UNSPECIFIED TYPE: ICD-10-CM

## 2019-07-15 LAB
ALBUMIN SERPL BCP-MCNC: 3.5 G/DL (ref 3.5–5.2)
ALP SERPL-CCNC: 78 U/L (ref 55–135)
ALT SERPL W/O P-5'-P-CCNC: 38 U/L (ref 10–44)
ANION GAP SERPL CALC-SCNC: 10 MMOL/L (ref 8–16)
AST SERPL-CCNC: 30 U/L (ref 10–40)
BASOPHILS # BLD AUTO: 0.05 K/UL (ref 0–0.2)
BASOPHILS NFR BLD: 0.5 % (ref 0–1.9)
BILIRUB SERPL-MCNC: 0.4 MG/DL (ref 0.1–1)
BUN SERPL-MCNC: 17 MG/DL (ref 8–23)
CALCIUM SERPL-MCNC: 10.2 MG/DL (ref 8.7–10.5)
CHLORIDE SERPL-SCNC: 102 MMOL/L (ref 95–110)
CHOLEST SERPL-MCNC: 192 MG/DL (ref 120–199)
CHOLEST/HDLC SERPL: 4.7 {RATIO} (ref 2–5)
CO2 SERPL-SCNC: 28 MMOL/L (ref 23–29)
CREAT SERPL-MCNC: 0.9 MG/DL (ref 0.5–1.4)
DIFFERENTIAL METHOD: ABNORMAL
EOSINOPHIL # BLD AUTO: 0.3 K/UL (ref 0–0.5)
EOSINOPHIL NFR BLD: 2.8 % (ref 0–8)
ERYTHROCYTE [DISTWIDTH] IN BLOOD BY AUTOMATED COUNT: 14.4 % (ref 11.5–14.5)
EST. GFR  (AFRICAN AMERICAN): >60 ML/MIN/1.73 M^2
EST. GFR  (NON AFRICAN AMERICAN): >60 ML/MIN/1.73 M^2
GLUCOSE SERPL-MCNC: 123 MG/DL (ref 70–110)
HCT VFR BLD AUTO: 45.4 % (ref 37–48.5)
HDLC SERPL-MCNC: 41 MG/DL (ref 40–75)
HDLC SERPL: 21.4 % (ref 20–50)
HGB BLD-MCNC: 14.3 G/DL (ref 12–16)
LDLC SERPL CALC-MCNC: 93.6 MG/DL (ref 63–159)
LYMPHOCYTES # BLD AUTO: 2.8 K/UL (ref 1–4.8)
LYMPHOCYTES NFR BLD: 28.1 % (ref 18–48)
MCH RBC QN AUTO: 30 PG (ref 27–31)
MCHC RBC AUTO-ENTMCNC: 31.5 G/DL (ref 32–36)
MCV RBC AUTO: 95 FL (ref 82–98)
MONOCYTES # BLD AUTO: 1 K/UL (ref 0.3–1)
MONOCYTES NFR BLD: 9.6 % (ref 4–15)
NEUTROPHILS # BLD AUTO: 5.9 K/UL (ref 1.8–7.7)
NEUTROPHILS NFR BLD: 59 % (ref 38–73)
NONHDLC SERPL-MCNC: 151 MG/DL
PLATELET # BLD AUTO: 232 K/UL (ref 150–350)
PMV BLD AUTO: 12.7 FL (ref 9.2–12.9)
POTASSIUM SERPL-SCNC: 4.4 MMOL/L (ref 3.5–5.1)
PROT SERPL-MCNC: 7.2 G/DL (ref 6–8.4)
RBC # BLD AUTO: 4.76 M/UL (ref 4–5.4)
SODIUM SERPL-SCNC: 140 MMOL/L (ref 136–145)
TRIGL SERPL-MCNC: 287 MG/DL (ref 30–150)
WBC # BLD AUTO: 10.04 K/UL (ref 3.9–12.7)

## 2019-07-15 PROCEDURE — 99397 PER PM REEVAL EST PAT 65+ YR: CPT | Mod: HCNC,S$GLB,, | Performed by: INTERNAL MEDICINE

## 2019-07-15 PROCEDURE — 80061 LIPID PANEL: CPT | Mod: HCNC

## 2019-07-15 PROCEDURE — 36415 COLL VENOUS BLD VENIPUNCTURE: CPT | Mod: HCNC

## 2019-07-15 PROCEDURE — 85025 COMPLETE CBC W/AUTO DIFF WBC: CPT | Mod: HCNC

## 2019-07-15 PROCEDURE — 3078F PR MOST RECENT DIASTOLIC BLOOD PRESSURE < 80 MM HG: ICD-10-PCS | Mod: HCNC,CPTII,S$GLB, | Performed by: INTERNAL MEDICINE

## 2019-07-15 PROCEDURE — 99999 PR PBB SHADOW E&M-EST. PATIENT-LVL III: CPT | Mod: PBBFAC,HCNC,, | Performed by: INTERNAL MEDICINE

## 2019-07-15 PROCEDURE — 99999 PR PBB SHADOW E&M-EST. PATIENT-LVL III: ICD-10-PCS | Mod: PBBFAC,HCNC,, | Performed by: INTERNAL MEDICINE

## 2019-07-15 PROCEDURE — 3078F DIAST BP <80 MM HG: CPT | Mod: HCNC,CPTII,S$GLB, | Performed by: INTERNAL MEDICINE

## 2019-07-15 PROCEDURE — 83036 HEMOGLOBIN GLYCOSYLATED A1C: CPT | Mod: HCNC

## 2019-07-15 PROCEDURE — 3074F PR MOST RECENT SYSTOLIC BLOOD PRESSURE < 130 MM HG: ICD-10-PCS | Mod: HCNC,CPTII,S$GLB, | Performed by: INTERNAL MEDICINE

## 2019-07-15 PROCEDURE — 3074F SYST BP LT 130 MM HG: CPT | Mod: HCNC,CPTII,S$GLB, | Performed by: INTERNAL MEDICINE

## 2019-07-15 PROCEDURE — 81003 URINALYSIS AUTO W/O SCOPE: CPT | Mod: HCNC

## 2019-07-15 PROCEDURE — 99397 PR PREVENTIVE VISIT,EST,65 & OVER: ICD-10-PCS | Mod: HCNC,S$GLB,, | Performed by: INTERNAL MEDICINE

## 2019-07-15 PROCEDURE — 81001 URINALYSIS AUTO W/SCOPE: CPT | Mod: HCNC

## 2019-07-15 PROCEDURE — 87086 URINE CULTURE/COLONY COUNT: CPT | Mod: HCNC

## 2019-07-15 PROCEDURE — 80053 COMPREHEN METABOLIC PANEL: CPT | Mod: HCNC

## 2019-07-15 RX ORDER — LACTULOSE 10 G/15ML
10 SOLUTION ORAL; RECTAL DAILY
Qty: 15 ML | Refills: 3 | Status: SHIPPED | OUTPATIENT
Start: 2019-07-15 | End: 2019-08-10 | Stop reason: SDUPTHER

## 2019-07-15 NOTE — PROGRESS NOTES
Spoke to pts daughter in regards to the care ecosystem research study    Will touch base with her later this week after she has reviewed the info.

## 2019-07-15 NOTE — PROGRESS NOTES
Name and  verified Inserted 16' hackett cathter  Was able to receive urine return, was able to empty pt bladder completely no signs of irritation

## 2019-07-16 ENCOUNTER — TELEPHONE (OUTPATIENT)
Dept: INTERNAL MEDICINE | Facility: CLINIC | Age: 79
End: 2019-07-16

## 2019-07-16 LAB
AMORPH CRY UR QL COMP ASSIST: ABNORMAL
BACTERIA #/AREA URNS AUTO: ABNORMAL /HPF
BACTERIA UR CULT: NO GROWTH
BILIRUB UR QL STRIP: NEGATIVE
CLARITY UR REFRACT.AUTO: ABNORMAL
COLOR UR AUTO: YELLOW
ESTIMATED AVG GLUCOSE: 151 MG/DL (ref 68–131)
GLUCOSE UR QL STRIP: ABNORMAL
HBA1C MFR BLD HPLC: 6.9 % (ref 4–5.6)
HGB UR QL STRIP: NEGATIVE
KETONES UR QL STRIP: NEGATIVE
LEUKOCYTE ESTERASE UR QL STRIP: NEGATIVE
MICROSCOPIC COMMENT: ABNORMAL
NITRITE UR QL STRIP: NEGATIVE
PH UR STRIP: 7 [PH] (ref 5–8)
PROT UR QL STRIP: NEGATIVE
RBC #/AREA URNS AUTO: 3 /HPF (ref 0–4)
SP GR UR STRIP: 1.02 (ref 1–1.03)
SQUAMOUS #/AREA URNS AUTO: 0 /HPF
URN SPEC COLLECT METH UR: ABNORMAL
WBC #/AREA URNS AUTO: 0 /HPF (ref 0–5)
YEAST UR QL AUTO: ABNORMAL

## 2019-07-16 NOTE — PROGRESS NOTES
CHIEF COMPLAINT:  Physical exam.    HISTORY OF PRESENT ILLNESS:  The patient is a 78-year-old female with a history   of Alzheimer disease, CKD stage III, hypertension, hyperlipidemia and   prediabetes who comes in today for annual physical exam.  The patient was   recently admitted to [facility] for a facial cellulitis.  She was   treated with vancomycin and was discharged to home on Bactrim twice a day for   five days.  Her daughter reports she completed the antibiotics.  She did develop   diarrhea while on the antibiotics.  This resolved as soon as the antibiotics   were complete.    REVIEW OF SYSTEMS:  Please see patient entered review of systems.  However, this   was entered in by the patient's daughter.  The patient is not able to answer   questions on her own.  This review of systems is 100% accurate because the   patient has Alzheimer dementia.  In addition to what was reported, the patient   is becoming less mobile.  She is more sedentary.  She is down to eating one meal   a day.  They are supplementing her intake with Ensure at least three cans a   day.  She does report having accidents with urination where she wet herself.    She does have problems with constipation.    PHYSICAL EXAMINATION:  GENERAL APPEARANCE:  No acute distress.  HEENT:  Conjunctivae clear.  Pupils are equal.  Right TM is obscured by wax, the   left is clear.  Nasal septum is midline without discharge.  Oropharynx is   without erythema.  NECK:  Trachea is midline without JVD, without thyromegaly.  CARDIOVASCULAR:  S1, S2.  EXTREMITIES:  With trace edema.  ABDOMEN:  Nontender, nondistended, without hepatosplenomegaly.    ASSESSMENT:  1.  Annual exam.  2.  Facial cellulitis, currently resolved.  3.  Hypertension.  4.  Hyperlipidemia.  5.  Dementia.  6.  Urinary incontinence.    PLAN:  We will put the patient in for a CBC, CMP, lipid panel, and UA.  This   would be a cath UA.  Also discontinue the patient's HCTZ secondary  to her low   blood pressure reading.  We will see if we can contact her Home Health to   increase her ADLs.      JDS/HN  dd: 07/16/2019 06:41:06 (CDT)  td: 07/16/2019 13:05:50 (CDT)  Doc ID   #[id number]  Job ID #[id number]    CC:       Answers for HPI/ROS submitted by the patient on 7/14/2019   activity change: Yes  unexpected weight change: No  neck pain: No  hearing loss: No  rhinorrhea: No  trouble swallowing: No  eye discharge: No  visual disturbance: No  chest tightness: No  wheezing: No  chest pain: No  palpitations: No  blood in stool: No  constipation: Yes  vomiting: No  diarrhea: No  polydipsia: No  polyuria: Yes  difficulty urinating: No  hematuria: No  menstrual problem: No  dysuria: No  joint swelling: No  arthralgias: No  headaches: No  weakness: No  confusion: No  dysphoric mood: No

## 2019-07-16 NOTE — TELEPHONE ENCOUNTER
----- Message from Kevin Han MD sent at 7/15/2019  6:32 PM CDT -----  Please call the patient regarding her abnormal result. Please contact the lab and add an A1C to the blood tests that were done today.

## 2019-07-17 ENCOUNTER — TELEPHONE (OUTPATIENT)
Dept: INTERNAL MEDICINE | Facility: CLINIC | Age: 79
End: 2019-07-17

## 2019-07-17 ENCOUNTER — PATIENT MESSAGE (OUTPATIENT)
Dept: INTERNAL MEDICINE | Facility: CLINIC | Age: 79
End: 2019-07-17

## 2019-07-17 NOTE — TELEPHONE ENCOUNTER
----- Message from Apple Page sent at 7/17/2019 12:15 PM CDT -----  Contact: Daughter [phone]  The Rx needs to be for 30 ml for a 30 day supply. Dosage and quantity  on RX is incorrect. Patient takes this medication daily (30ML)  Daughter would like a call back to discuss.  New Rx should be sent to .[facility]/pharmacy #[id number] - [place], [place] - [address] [phone] (Phone) [phone] (Fax)    Please call and advise  Thank you

## 2019-07-17 NOTE — TELEPHONE ENCOUNTER
----- Message from Kevin Han MD sent at 7/16/2019  4:43 PM CDT -----  Please call the patient regarding her abnormal result. Please notify her daughter and let her know that her mother's blood sugars are averaging around 150. SHe had told me she was giving her mother supplements like Ensure. Please have her change this to Glucerna. This is for people with diabetes.

## 2019-07-17 NOTE — TELEPHONE ENCOUNTER
Daughter states that states that she called Monday and has not heard any response back about lactulose needs to be changed to a quantity of 30 ml and not 15 ml.  She is not happy.

## 2019-07-22 RX ORDER — MEMANTINE HYDROCHLORIDE 10 MG/1
TABLET ORAL
Qty: 180 TABLET | Refills: 3 | Status: SHIPPED | OUTPATIENT
Start: 2019-07-22

## 2019-07-31 ENCOUNTER — TELEPHONE (OUTPATIENT)
Dept: INTERNAL MEDICINE | Facility: CLINIC | Age: 79
End: 2019-07-31

## 2019-07-31 NOTE — TELEPHONE ENCOUNTER
----- Message from Lucio Landis sent at 7/31/2019  2:47 PM CDT -----  Contact: [facility] [phone]  Patient is returning a phone call.  Who left a message for the patient: Corry  Does patient know what this is regarding: No    Comments:

## 2019-07-31 NOTE — TELEPHONE ENCOUNTER
Another nurse called earlier about a yeast infection? I could not reach her. I spoke to Rosanna. She states that she did note a vaginal yeast infection.  Pt. has been itching and scratching.  She wears a diaper all day.  Can you call in a prescription for her?

## 2019-07-31 NOTE — TELEPHONE ENCOUNTER
----- Message from Trip Otto sent at 7/31/2019 11:59 AM CDT -----  Contact: [facility]    Type: Home Health (orders, updates, clarifications, etc.)     Home Health Agency/Nurse:[facility]      Phone number: [phone]    Reason for call: patient has a possible yeast infection .     Comments: please call and advise, Thanks

## 2019-08-01 ENCOUNTER — TELEPHONE (OUTPATIENT)
Dept: INTERNAL MEDICINE | Facility: CLINIC | Age: 79
End: 2019-08-01

## 2019-08-01 DIAGNOSIS — B37.9 YEAST INFECTION: Primary | ICD-10-CM

## 2019-08-01 RX ORDER — FLUCONAZOLE 150 MG/1
150 TABLET ORAL DAILY
Qty: 1 TABLET | Refills: 0 | Status: SHIPPED | OUTPATIENT
Start: 2019-08-01 | End: 2019-08-02

## 2019-08-01 NOTE — TELEPHONE ENCOUNTER
----- Message from Joanna Michael sent at 8/1/2019  2:59 PM CDT -----  Contact: Amy(daughter) [phone]  Patient would like to get medical advice.  Symptoms (please be specific):yeast infection  How long has patient had these symptoms:  1 day  Pharmacy name and phone # (copy/paste from chart): [id number] [place], [place] - [address]   [phone] (Phone)  [phone] (Fax)  Would the patient rather a call back or a response via [facility]?:call back    Comments:  Please advise, thanks

## 2019-08-01 NOTE — TELEPHONE ENCOUNTER
I already spoke to the home health nurse on yesterday.  I sent the message to Dr Han. I spoke to the daughter too. Dr Han the daughter is asking for a tablet and not a capsule.

## 2019-08-08 ENCOUNTER — EXTERNAL HOME HEALTH (OUTPATIENT)
Dept: HOME HEALTH SERVICES | Facility: HOSPITAL | Age: 79
End: 2019-08-08
Payer: MEDICARE

## 2019-08-10 RX ORDER — LACTULOSE 10 G/15ML
10 SOLUTION ORAL; RECTAL DAILY
Qty: 900 ML | Refills: 0 | Status: SHIPPED | OUTPATIENT
Start: 2019-08-10 | End: 2019-09-02 | Stop reason: SDUPTHER

## 2019-08-26 RX ORDER — QUETIAPINE FUMARATE 25 MG/1
50 TABLET, FILM COATED ORAL 2 TIMES DAILY
Qty: 120 TABLET | Refills: 11 | Status: SHIPPED | OUTPATIENT
Start: 2019-08-26 | End: 2020-02-03 | Stop reason: DRUGHIGH

## 2019-08-26 NOTE — TELEPHONE ENCOUNTER
----- Message from Apple Page sent at 8/26/2019  8:59 AM CDT -----  Contact: Daughter [phone]  Patient has been taking 4 pills a day.  Would like a new RX to reflect 4 times a day    Patient is calling for an RX refill or new RX.  Is this a refill or new RX:  New RX  RX name and strength: QUEtiapine (SEROQUEL) 25 MG Tab  Directions (copy/paste from chart):    Is this a 30 day or 90 day RX:  30  Local pharmacy or mail order pharmacy:  Local  Ph[facility] and phone [id number] [place] [address] [phone] (Phone) [phone] (Fax)      Comments:  Patient is running out of medication

## 2019-09-02 RX ORDER — LACTULOSE 10 G/15ML
10 SOLUTION ORAL; RECTAL DAILY
Qty: 900 ML | Refills: 0 | Status: SHIPPED | OUTPATIENT
Start: 2019-09-02 | End: 2020-02-01

## 2019-09-11 PROCEDURE — G0179 MD RECERTIFICATION HHA PT: HCPCS | Mod: ,,, | Performed by: INTERNAL MEDICINE

## 2019-09-11 PROCEDURE — G0179 PR HOME HEALTH MD RECERTIFICATION: ICD-10-PCS | Mod: ,,, | Performed by: INTERNAL MEDICINE

## 2019-09-25 ENCOUNTER — PATIENT OUTREACH (OUTPATIENT)
Dept: ADMINISTRATIVE | Facility: HOSPITAL | Age: 79
End: 2019-09-25

## 2019-10-04 ENCOUNTER — EXTERNAL HOME HEALTH (OUTPATIENT)
Dept: HOME HEALTH SERVICES | Facility: HOSPITAL | Age: 79
End: 2019-10-04
Payer: MEDICARE

## 2019-10-16 ENCOUNTER — OFFICE VISIT (OUTPATIENT)
Dept: INTERNAL MEDICINE | Facility: CLINIC | Age: 79
End: 2019-10-16
Payer: MEDICARE

## 2019-10-16 ENCOUNTER — LAB VISIT (OUTPATIENT)
Dept: LAB | Facility: HOSPITAL | Age: 79
End: 2019-10-16
Attending: INTERNAL MEDICINE
Payer: MEDICARE

## 2019-10-16 ENCOUNTER — IMMUNIZATION (OUTPATIENT)
Dept: INTERNAL MEDICINE | Facility: CLINIC | Age: 79
End: 2019-10-16
Payer: MEDICARE

## 2019-10-16 VITALS
OXYGEN SATURATION: 95 % | DIASTOLIC BLOOD PRESSURE: 68 MMHG | BODY MASS INDEX: 26.39 KG/M2 | HEART RATE: 51 BPM | SYSTOLIC BLOOD PRESSURE: 106 MMHG | HEIGHT: 60 IN

## 2019-10-16 DIAGNOSIS — G30.9 ALZHEIMER'S DEMENTIA WITH BEHAVIORAL DISTURBANCE, UNSPECIFIED TIMING OF DEMENTIA ONSET: Primary | ICD-10-CM

## 2019-10-16 DIAGNOSIS — E78.5 HYPERLIPIDEMIA, UNSPECIFIED HYPERLIPIDEMIA TYPE: ICD-10-CM

## 2019-10-16 DIAGNOSIS — I10 ESSENTIAL HYPERTENSION: ICD-10-CM

## 2019-10-16 DIAGNOSIS — G30.9 ALZHEIMER'S DEMENTIA WITH BEHAVIORAL DISTURBANCE, UNSPECIFIED TIMING OF DEMENTIA ONSET: ICD-10-CM

## 2019-10-16 DIAGNOSIS — R73.09 ELEVATED GLUCOSE: ICD-10-CM

## 2019-10-16 DIAGNOSIS — F02.81 ALZHEIMER'S DEMENTIA WITH BEHAVIORAL DISTURBANCE, UNSPECIFIED TIMING OF DEMENTIA ONSET: Primary | ICD-10-CM

## 2019-10-16 DIAGNOSIS — F02.81 ALZHEIMER'S DEMENTIA WITH BEHAVIORAL DISTURBANCE, UNSPECIFIED TIMING OF DEMENTIA ONSET: ICD-10-CM

## 2019-10-16 LAB
ALBUMIN SERPL BCP-MCNC: 3.7 G/DL (ref 3.5–5.2)
ALP SERPL-CCNC: 79 U/L (ref 55–135)
ALT SERPL W/O P-5'-P-CCNC: 17 U/L (ref 10–44)
ANION GAP SERPL CALC-SCNC: 12 MMOL/L (ref 8–16)
AST SERPL-CCNC: 17 U/L (ref 10–40)
BASOPHILS # BLD AUTO: 0.04 K/UL (ref 0–0.2)
BASOPHILS NFR BLD: 0.4 % (ref 0–1.9)
BILIRUB SERPL-MCNC: 0.3 MG/DL (ref 0.1–1)
BUN SERPL-MCNC: 24 MG/DL (ref 8–23)
CALCIUM SERPL-MCNC: 9.9 MG/DL (ref 8.7–10.5)
CHLORIDE SERPL-SCNC: 107 MMOL/L (ref 95–110)
CHOLEST SERPL-MCNC: 177 MG/DL (ref 120–199)
CHOLEST/HDLC SERPL: 4.7 {RATIO} (ref 2–5)
CO2 SERPL-SCNC: 25 MMOL/L (ref 23–29)
CREAT SERPL-MCNC: 0.9 MG/DL (ref 0.5–1.4)
DIFFERENTIAL METHOD: ABNORMAL
EOSINOPHIL # BLD AUTO: 0.3 K/UL (ref 0–0.5)
EOSINOPHIL NFR BLD: 3 % (ref 0–8)
ERYTHROCYTE [DISTWIDTH] IN BLOOD BY AUTOMATED COUNT: 14.5 % (ref 11.5–14.5)
EST. GFR  (AFRICAN AMERICAN): >60 ML/MIN/1.73 M^2
EST. GFR  (NON AFRICAN AMERICAN): >60 ML/MIN/1.73 M^2
ESTIMATED AVG GLUCOSE: 117 MG/DL (ref 68–131)
GLUCOSE SERPL-MCNC: 156 MG/DL (ref 70–110)
HBA1C MFR BLD HPLC: 5.7 % (ref 4–5.6)
HCT VFR BLD AUTO: 47.1 % (ref 37–48.5)
HDLC SERPL-MCNC: 38 MG/DL (ref 40–75)
HDLC SERPL: 21.5 % (ref 20–50)
HGB BLD-MCNC: 15 G/DL (ref 12–16)
LDLC SERPL CALC-MCNC: 91.4 MG/DL (ref 63–159)
LYMPHOCYTES # BLD AUTO: 2.3 K/UL (ref 1–4.8)
LYMPHOCYTES NFR BLD: 24.1 % (ref 18–48)
MCH RBC QN AUTO: 29.8 PG (ref 27–31)
MCHC RBC AUTO-ENTMCNC: 31.8 G/DL (ref 32–36)
MCV RBC AUTO: 94 FL (ref 82–98)
MONOCYTES # BLD AUTO: 0.7 K/UL (ref 0.3–1)
MONOCYTES NFR BLD: 7 % (ref 4–15)
NEUTROPHILS # BLD AUTO: 6.1 K/UL (ref 1.8–7.7)
NEUTROPHILS NFR BLD: 65.5 % (ref 38–73)
NONHDLC SERPL-MCNC: 139 MG/DL
PLATELET # BLD AUTO: 155 K/UL (ref 150–350)
PMV BLD AUTO: ABNORMAL FL (ref 9.2–12.9)
POTASSIUM SERPL-SCNC: 3.9 MMOL/L (ref 3.5–5.1)
PROT SERPL-MCNC: 7.6 G/DL (ref 6–8.4)
RBC # BLD AUTO: 5.04 M/UL (ref 4–5.4)
SODIUM SERPL-SCNC: 144 MMOL/L (ref 136–145)
TRIGL SERPL-MCNC: 238 MG/DL (ref 30–150)
WBC # BLD AUTO: 9.38 K/UL (ref 3.9–12.7)

## 2019-10-16 PROCEDURE — 1101F PR PT FALLS ASSESS DOC 0-1 FALLS W/OUT INJ PAST YR: ICD-10-PCS | Mod: CPTII,S$GLB,, | Performed by: INTERNAL MEDICINE

## 2019-10-16 PROCEDURE — 3074F PR MOST RECENT SYSTOLIC BLOOD PRESSURE < 130 MM HG: ICD-10-PCS | Mod: CPTII,S$GLB,, | Performed by: INTERNAL MEDICINE

## 2019-10-16 PROCEDURE — 99499 UNLISTED E&M SERVICE: CPT | Mod: HCNC,S$GLB,, | Performed by: INTERNAL MEDICINE

## 2019-10-16 PROCEDURE — 80061 LIPID PANEL: CPT

## 2019-10-16 PROCEDURE — 1101F PT FALLS ASSESS-DOCD LE1/YR: CPT | Mod: CPTII,S$GLB,, | Performed by: INTERNAL MEDICINE

## 2019-10-16 PROCEDURE — 3078F DIAST BP <80 MM HG: CPT | Mod: CPTII,S$GLB,, | Performed by: INTERNAL MEDICINE

## 2019-10-16 PROCEDURE — 90662 IIV NO PRSV INCREASED AG IM: CPT | Mod: S$GLB,,, | Performed by: INTERNAL MEDICINE

## 2019-10-16 PROCEDURE — 99214 PR OFFICE/OUTPT VISIT, EST, LEVL IV, 30-39 MIN: ICD-10-PCS | Mod: 25,S$GLB,, | Performed by: INTERNAL MEDICINE

## 2019-10-16 PROCEDURE — 85025 COMPLETE CBC W/AUTO DIFF WBC: CPT

## 2019-10-16 PROCEDURE — 99999 PR PBB SHADOW E&M-EST. PATIENT-LVL III: CPT | Mod: PBBFAC,,, | Performed by: INTERNAL MEDICINE

## 2019-10-16 PROCEDURE — 99214 OFFICE O/P EST MOD 30 MIN: CPT | Mod: 25,S$GLB,, | Performed by: INTERNAL MEDICINE

## 2019-10-16 PROCEDURE — 99499 RISK ADDL DX/OHS AUDIT: ICD-10-PCS | Mod: HCNC,S$GLB,, | Performed by: INTERNAL MEDICINE

## 2019-10-16 PROCEDURE — 3074F SYST BP LT 130 MM HG: CPT | Mod: CPTII,S$GLB,, | Performed by: INTERNAL MEDICINE

## 2019-10-16 PROCEDURE — G0008 FLU VACCINE - HIGH DOSE (65+) PRESERVATIVE FREE IM: ICD-10-PCS | Mod: S$GLB,,, | Performed by: INTERNAL MEDICINE

## 2019-10-16 PROCEDURE — G0008 ADMIN INFLUENZA VIRUS VAC: HCPCS | Mod: S$GLB,,, | Performed by: INTERNAL MEDICINE

## 2019-10-16 PROCEDURE — 83036 HEMOGLOBIN GLYCOSYLATED A1C: CPT

## 2019-10-16 PROCEDURE — 3078F PR MOST RECENT DIASTOLIC BLOOD PRESSURE < 80 MM HG: ICD-10-PCS | Mod: CPTII,S$GLB,, | Performed by: INTERNAL MEDICINE

## 2019-10-16 PROCEDURE — 36415 COLL VENOUS BLD VENIPUNCTURE: CPT

## 2019-10-16 PROCEDURE — 80053 COMPREHEN METABOLIC PANEL: CPT

## 2019-10-16 PROCEDURE — 90662 FLU VACCINE - HIGH DOSE (65+) PRESERVATIVE FREE IM: ICD-10-PCS | Mod: S$GLB,,, | Performed by: INTERNAL MEDICINE

## 2019-10-16 PROCEDURE — 99999 PR PBB SHADOW E&M-EST. PATIENT-LVL III: ICD-10-PCS | Mod: PBBFAC,,, | Performed by: INTERNAL MEDICINE

## 2019-10-17 NOTE — PROGRESS NOTES
CC:  Followup.    HPI:  The patient is a 79-year-old female with Alzheimer's dementia, hypertension, hyperlipidemia and non insulin-dependent diabetes who presents today with her daughter and son-in-law follow-up.  Her daughter reports that the patient's memory is getting worse.  Patient had been on hospice for failure to thrive.  She was not eating.  This actually improved.  The patient eats 2 meals a day.  She eats breakfast plus 1 large meal during the day.  She is also drinking.  She only eats or drinks when food and water is given to her.  She was taken off of hospice placed on home health.  Home health is no longer coming out to the house.  She being cared for sore he by her daughter family.  Her daughter puts on the toilet every 2-3 hours since she does not indicate whether she needs to go to not.  Has no accidents except at night.  When they checked her in the morning, she is wet.  The patient does have osteoporosis.  She cannot take Fosamax because she was too the capsule.  It cannot be given to her crushed.  Cannot participate in a bone density test secondary to Alzheimer's.    Sumaya for HPI/ROS submitted by the patient on 10/15/2019   activity change: No  unexpected weight change: No  neck pain: No  hearing loss: No  rhinorrhea: No  trouble swallowing: No  eye discharge: No  visual disturbance: No  chest tightness: No  wheezing: No  chest pain: No  palpitations: No  blood in stool: No  constipation: No  vomiting: No  diarrhea: No  polydipsia: No  polyuria: No  difficulty urinating: No  hematuria: No  menstrual problem: No  dysuria: No  joint swelling: No  arthralgias: No  headaches: No  weakness: No  confusion: No  dysphoric mood: No  ROS is not accurate secondary to the patient not being able to answer questions due to her Alzheimer's.  The ROS was pulled out patient's daughter.  Patient is eating soft foods.  She is drinking 3 concerns today.    Physical exam:  General appearance:  No acute  distress.  HEENT:  Conjunctiva is clear.  Nasal septum is midline. Oropharynx shows moist mucous membranes.  I cannot visualize tympanic membranes due to patient compliance.  Pulmonary:  Good inspiratory, expiratory breath sounds are heard.  Lungs are clear to auscultation.  Cardiovascular:  S1-S2 with a 1 to 2/6 systolic ejection murmur heard best at the right upper sternal border  GI:  Abdomen was nontender, nondistended without hepatosplenomegaly.  Extremities:  Trace edema.    Assessment:  1.  Alzheimer's dementia  2.  Hypertension currently stable  3.  Hyperlipidemia  4.  Noninsulin dependent diabetes managed by diet alone.  5.  Osteoporosis    Plan:  1.  Will get a CBC, CMP, A1c, lipid panel  2.  Flu shot  3.  Did discuss the patient's daughter that I will look into injectables osteoporosis.

## 2019-12-02 DIAGNOSIS — E78.5 HYPERLIPIDEMIA, UNSPECIFIED HYPERLIPIDEMIA TYPE: ICD-10-CM

## 2019-12-02 RX ORDER — TIMOLOL MALEATE 5 MG/ML
SOLUTION/ DROPS OPHTHALMIC
Qty: 15 ML | Refills: 0 | Status: SHIPPED | OUTPATIENT
Start: 2019-12-02 | End: 2020-02-07

## 2019-12-05 ENCOUNTER — TELEPHONE (OUTPATIENT)
Dept: INTERNAL MEDICINE | Facility: CLINIC | Age: 79
End: 2019-12-05

## 2019-12-05 RX ORDER — SIMVASTATIN 40 MG/1
TABLET, FILM COATED ORAL
Qty: 90 TABLET | Refills: 0 | OUTPATIENT
Start: 2019-12-05

## 2019-12-05 NOTE — TELEPHONE ENCOUNTER
----- Message from Layla David sent at 12/5/2019 10:58 AM CST -----  Contact: patient's daughter Amy [phone] or cell [phone]  [facility] told patient to call because there has been no response to their request for refills x 4 days .     Patient said that patient has requested simvastatin (ZOCOR) 40 MG tablet and memantine (NAMENDA) 10 MG Tab  Both 90 day supplies     [facility] Mail [facility] - [facility] [address] [facility] [phone]

## 2019-12-05 NOTE — PROGRESS NOTES
Refill Authorization Note     is requesting a refill authorization.    Brief assessment and rationale for refill: APPROVE: prr                Medication reconciliation completed: No                         Comments:   Last Prescribed Info:   Ordering Provider: -- FLIP #:  -- NPI:  --    Authorizing Provider: Kevin Han MD FLIP #:  [id number] NPI:  [id number]    Ordering User:  Kevin Han MD            Diagnosis Association: Hyperlipidemia, unspecified hyperlipidemia type (E78.5)      Original Order:  simvastatin (ZOCOR) 40 MG tablet [[id number]]      Pharmacy:  [facility] - [address] FLIP #:  --     Pharmacy Comments:  --          Fill quantity remaining:  -- Fill quantity used:  -- Next fill due: --       Outpatient Medication Detail      Disp Refills Start End    simvastatin (ZOCOR) 40 MG tablet 90 tablet 3 3/6/2019     Sig: TAKE 1 TABLET EVERY EVENING    Sent to pharmacy as: simvastatin (ZOCOR) 40 MG tablet    E-Prescribing Status: Receipt confirmed by pharmacy (3/6/2019 10:37 AM CST)

## 2019-12-05 NOTE — TELEPHONE ENCOUNTER
Spoke with [employer] pharmacist and confirmed refills on medication.  Spoke with pt's daughter informed of refills and that when refills are getting close to call atleast 2-3 weeks ahead to [employer] and order, there is no more automatic refill service.

## 2019-12-18 RX ORDER — BENAZEPRIL HYDROCHLORIDE 5 MG/1
5 TABLET ORAL NIGHTLY
Qty: 90 TABLET | Refills: 3 | Status: SHIPPED | OUTPATIENT
Start: 2019-12-18 | End: 2020-12-17

## 2019-12-18 NOTE — PROGRESS NOTES
Refill Authorization Note     is requesting a refill authorization.    Brief assessment and rationale for refill: APPROVE: prr                                         Comments:  Requested Prescriptions   Pending Prescriptions Disp Refills    benazepril (LOTENSIN) 5 MG tablet [Pharmacy Med Name: BENAZEPRIL HCL 5 MG TABLET] 90 tablet 3     Sig: TAKE 1 TABLET (5 MG TOTAL) BY MOUTH EVERY EVENING.       ACE Inhibitors Refill Protocol Passed - 12/18/2019  2:02 AM        Passed - Patient is not currently pregnant.        Passed - No positive pregnancy test in past 12 months        Passed - Serum Creatinine less than 1.4 on file in the past 12 months     Lab Results   Component Value Date    CREATININE 0.9 10/16/2019    CREATININE 0.9 07/15/2019    CREATININE 0.7 06/30/2019     Lab Results   Component Value Date    EGFRNONAA >60 10/16/2019    EGFRNONAA >60 07/15/2019    EGFRNONAA >60.0 06/30/2019               Passed - Visit with authorizing provider in past 12 months or upcoming 90 days         Passed - Serum Potassium less than 5.2 on file in the past 12 months     Lab Results   Component Value Date    K 3.9 10/16/2019    K 4.4 07/15/2019    K 3.7 06/30/2019                  Passed - Blood Pressure under 139/89 on file in past 12 months      BP Readings from Last 3 Encounters:   10/16/19 106/68   07/15/19 108/60   06/30/19 120/70             Appointments  past 12m or future 3m with PCP    Date Provider   Last Visit   10/16/2019 Kevin Han MD   Next Visit   2/3/2020 Kevin Han MD

## 2019-12-23 NOTE — PROGRESS NOTES
Refill Authorization Note     is requesting a refill authorization.    Brief assessment and rationale for refill: REVIEW; inapprop refill request/ op  Name and strength of medication: QUEtiapine (SEROQUEL) 50 MG tablet       Medication Therapy Plan: appears dosage mat be 25 with breakfast, 25mg lunch, 25 at dinner and 75mg at bedtime (6/6/2017). last commented as taking 4 tablets daily(8/26/2019- refill request);  please review current dosage if any updated alternate sig can be found,  [facility] may be requesting inappropriately as pt has not filled at [facility] ,[facility] uses [facility];     Medication reconciliation completed: No   Pharmacist Review Requested: Yes                     Comments:     Last Prescribed Info:      Dispenses      Dispensed Days Supply Quantity Provider Pharmacy   QUETIAPINE FUMARATE 25 MG TAB 11/25/2019 90 360 each [facility]LDO HAN [id number]   QUETIAPINE FUMARATE 25 MG TAB 08/31/2019 90 360 each [facility]LDO HAN [id number] [place]   QUETIAPINE FUMARATE 25 MG TAB 07/06/2019 90 270 each [facility]RNALDO HAN [id number] [place]   QUETIAPINE FUMARATE 25 MG TAB 06/11/2019 30 120 each MONA BAUGH [id number]   quetiapine 50 mg tablet 05/27/2019 90 90 tablet  [facility] Mail D...                       Requested Prescriptions   Pending Prescriptions Disp Refills    QUEtiapine (SEROQUEL) 50 MG tablet 90 tablet 3     Sig: Take 1 tablet (50 mg total) by mouth nightly.       There is no refill protocol information for this order        Appointments  past 12m or future 3m with PCP    Date Provider   Last Visit   10/16/2019 Arnaldo Han MD   Next Visit   2/3/2020 Arnaldo Han MD

## 2019-12-23 NOTE — TELEPHONE ENCOUNTER
I have reviewed and agree with the assessment below.  Behavioral issues 2/2 dementia not commented on; unclear what dosage of quetiapine pt is using; pt does is only 67% adherent when picking up 360 tablets for 90 days; pt may be using less now and may benefit from reevaluation.  Thanks

## 2019-12-24 NOTE — PROGRESS NOTES
Refill Routing Note     Medication(s) are not appropriate for processing by [facility]:    Medication Outside of Protocol    Appointments  past 12m or future 3m with PCP    Date Provider   Last Visit   10/16/2019 Kevin Han MD   Next Visit   2/3/2020 Kevin Han MD           Automatic Epic Protocol Generated Data:    Requested Prescriptions   Pending Prescriptions Disp Refills    donepezil (ARICEPT) 10 MG tablet [Pharmacy Med Name: DONEPEZIL HCL 10 MG Tablet] 90 tablet 3     Sig: TAKE 1 TABLET EVERY DAY       There is no refill protocol information for this order

## 2019-12-26 ENCOUNTER — TELEPHONE (OUTPATIENT)
Dept: INTERNAL MEDICINE | Facility: CLINIC | Age: 79
End: 2019-12-26

## 2019-12-26 RX ORDER — QUETIAPINE FUMARATE 50 MG/1
50 TABLET, FILM COATED ORAL NIGHTLY
Qty: 90 TABLET | Refills: 3 | Status: SHIPPED | OUTPATIENT
Start: 2019-12-26

## 2019-12-26 RX ORDER — DONEPEZIL HYDROCHLORIDE 10 MG/1
TABLET, FILM COATED ORAL
Qty: 90 TABLET | Refills: 3 | Status: SHIPPED | OUTPATIENT
Start: 2019-12-26

## 2019-12-26 NOTE — TELEPHONE ENCOUNTER
----- Message from Apple Page sent at 12/26/2019 10:52 AM CST -----  Contact: Amy [phone]  Patient is calling for an RX refill or new RX.  Is this a refill or new RX:  refill  RX name and strength: QUEtiapine (SEROQUEL) 50 MG tablet  Directions (copy/paste from chart):  Take 75 mg by mouth nightly. - Oral  Is this a 30 day or 90 day RX: 90   Local pharmacy or mail order pharmacy:  Mail order  Pharmacy name and phone # [facility] - [place], [place] - [address] [phone] (Phone)[phone] (Fax)         Comments:      Patient is calling for an RX refill or new RX.  Is this a refill or new RX:  Refill   RX name and strength: donepezil (ARICEPT) 10 MG tablet  Directions (copy/paste from chart):  TAKE 1 TABLET EVERY DAY  Is this a 30 day or 90 day RX:  90  Local pharmacy or mail order pharmacy: mail order   Pharmacy name and phone #[facility] - [place], [place] - [address] [phone] (Phone) [phone] (Fax)    Comments:

## 2020-01-20 ENCOUNTER — PATIENT OUTREACH (OUTPATIENT)
Dept: ADMINISTRATIVE | Facility: HOSPITAL | Age: 80
End: 2020-01-20

## 2020-01-30 NOTE — PROGRESS NOTES
Refill Routing Note     Medication(s) are appropriate for refill:    Medication Outside of Protocol    Appointments  past 15m or future 3m with PCP    Date Provider   Last Visit   10/16/2019 Kevin Han MD   Next Visit   2/3/2020 Kevin Han MD       Automatic Epic Protocol Generated Data:    Requested Prescriptions   Pending Prescriptions Disp Refills    lactulose (CHRONULAC) 10 gram/15 mL solution [Pharmacy Med Name: LACTULOSE 10 GM/15 ML SOLUTION] 900 mL 0     Sig: TAKE 15 MLS (10 G TOTAL) BY MOUTH ONCE DAILY.       Laxative Protocol Failed - 1/30/2020 10:32 AM        Failed - Up to date with colon cancer screening Health Maintenance        Passed - Recent visit with authorizing provider        Passed - Patient is not currently pregnant        Passed - No positive pregnancy test in past 12 months            Note created:01/30/2020

## 2020-02-01 RX ORDER — LACTULOSE 10 G/15ML
10 SOLUTION ORAL; RECTAL DAILY
Qty: 900 ML | Refills: 0 | Status: SHIPPED | OUTPATIENT
Start: 2020-02-01

## 2020-02-03 ENCOUNTER — LAB VISIT (OUTPATIENT)
Dept: LAB | Facility: HOSPITAL | Age: 80
End: 2020-02-03
Attending: INTERNAL MEDICINE
Payer: MEDICARE

## 2020-02-03 ENCOUNTER — OFFICE VISIT (OUTPATIENT)
Dept: INTERNAL MEDICINE | Facility: CLINIC | Age: 80
End: 2020-02-03
Payer: MEDICARE

## 2020-02-03 VITALS — BODY MASS INDEX: 26.39 KG/M2 | SYSTOLIC BLOOD PRESSURE: 128 MMHG | DIASTOLIC BLOOD PRESSURE: 66 MMHG | HEIGHT: 60 IN

## 2020-02-03 DIAGNOSIS — I10 ESSENTIAL HYPERTENSION: ICD-10-CM

## 2020-02-03 DIAGNOSIS — R73.09 ELEVATED GLUCOSE: ICD-10-CM

## 2020-02-03 DIAGNOSIS — E78.5 HYPERLIPIDEMIA, UNSPECIFIED HYPERLIPIDEMIA TYPE: ICD-10-CM

## 2020-02-03 DIAGNOSIS — F02.81 ALZHEIMER'S DEMENTIA WITH BEHAVIORAL DISTURBANCE, UNSPECIFIED TIMING OF DEMENTIA ONSET: Primary | ICD-10-CM

## 2020-02-03 DIAGNOSIS — G30.9 ALZHEIMER'S DEMENTIA WITH BEHAVIORAL DISTURBANCE, UNSPECIFIED TIMING OF DEMENTIA ONSET: Primary | ICD-10-CM

## 2020-02-03 LAB
ESTIMATED AVG GLUCOSE: 120 MG/DL (ref 68–131)
HBA1C MFR BLD HPLC: 5.8 % (ref 4–5.6)

## 2020-02-03 PROCEDURE — 1126F PR PAIN SEVERITY QUANTIFIED, NO PAIN PRESENT: ICD-10-PCS | Mod: HCNC,S$GLB,, | Performed by: INTERNAL MEDICINE

## 2020-02-03 PROCEDURE — 99999 PR PBB SHADOW E&M-EST. PATIENT-LVL III: CPT | Mod: PBBFAC,HCNC,, | Performed by: INTERNAL MEDICINE

## 2020-02-03 PROCEDURE — 83036 HEMOGLOBIN GLYCOSYLATED A1C: CPT | Mod: HCNC

## 2020-02-03 PROCEDURE — 1159F MED LIST DOCD IN RCRD: CPT | Mod: HCNC,S$GLB,, | Performed by: INTERNAL MEDICINE

## 2020-02-03 PROCEDURE — 3078F PR MOST RECENT DIASTOLIC BLOOD PRESSURE < 80 MM HG: ICD-10-PCS | Mod: HCNC,CPTII,S$GLB, | Performed by: INTERNAL MEDICINE

## 2020-02-03 PROCEDURE — 1159F PR MEDICATION LIST DOCUMENTED IN MEDICAL RECORD: ICD-10-PCS | Mod: HCNC,S$GLB,, | Performed by: INTERNAL MEDICINE

## 2020-02-03 PROCEDURE — 99499 RISK ADDL DX/OHS AUDIT: ICD-10-PCS | Mod: S$GLB,,, | Performed by: INTERNAL MEDICINE

## 2020-02-03 PROCEDURE — 1101F PR PT FALLS ASSESS DOC 0-1 FALLS W/OUT INJ PAST YR: ICD-10-PCS | Mod: HCNC,CPTII,S$GLB, | Performed by: INTERNAL MEDICINE

## 2020-02-03 PROCEDURE — 80061 LIPID PANEL: CPT | Mod: HCNC

## 2020-02-03 PROCEDURE — 99213 PR OFFICE/OUTPT VISIT, EST, LEVL III, 20-29 MIN: ICD-10-PCS | Mod: HCNC,S$GLB,, | Performed by: INTERNAL MEDICINE

## 2020-02-03 PROCEDURE — 1126F AMNT PAIN NOTED NONE PRSNT: CPT | Mod: HCNC,S$GLB,, | Performed by: INTERNAL MEDICINE

## 2020-02-03 PROCEDURE — 80053 COMPREHEN METABOLIC PANEL: CPT | Mod: HCNC

## 2020-02-03 PROCEDURE — 3074F PR MOST RECENT SYSTOLIC BLOOD PRESSURE < 130 MM HG: ICD-10-PCS | Mod: HCNC,CPTII,S$GLB, | Performed by: INTERNAL MEDICINE

## 2020-02-03 PROCEDURE — 36415 COLL VENOUS BLD VENIPUNCTURE: CPT | Mod: HCNC

## 2020-02-03 PROCEDURE — 3078F DIAST BP <80 MM HG: CPT | Mod: HCNC,CPTII,S$GLB, | Performed by: INTERNAL MEDICINE

## 2020-02-03 PROCEDURE — 99213 OFFICE O/P EST LOW 20 MIN: CPT | Mod: HCNC,S$GLB,, | Performed by: INTERNAL MEDICINE

## 2020-02-03 PROCEDURE — 1101F PT FALLS ASSESS-DOCD LE1/YR: CPT | Mod: HCNC,CPTII,S$GLB, | Performed by: INTERNAL MEDICINE

## 2020-02-03 PROCEDURE — 99499 UNLISTED E&M SERVICE: CPT | Mod: S$GLB,,, | Performed by: INTERNAL MEDICINE

## 2020-02-03 PROCEDURE — 99999 PR PBB SHADOW E&M-EST. PATIENT-LVL III: ICD-10-PCS | Mod: PBBFAC,HCNC,, | Performed by: INTERNAL MEDICINE

## 2020-02-03 PROCEDURE — 3074F SYST BP LT 130 MM HG: CPT | Mod: HCNC,CPTII,S$GLB, | Performed by: INTERNAL MEDICINE

## 2020-02-03 RX ORDER — QUETIAPINE FUMARATE 25 MG/1
TABLET, FILM COATED ORAL
Qty: 450 TABLET | Refills: 11 | Status: SHIPPED | OUTPATIENT
Start: 2020-02-03

## 2020-02-03 NOTE — PROGRESS NOTES
CC:  Follow-up of dementia.    HPI:  The patient is an 79-year-old female who is currently being seen for Alzheimer's dementia, aortic stenosis, CKD stage 3, hypertension, hyperlipidemia osteoporosis presents today with her daughter follow-up of Alzheimer's dementia.  She is currently doing well.  She is being cared for at home.  The only complaint her daughter brings up is she is not sleeping all night long.  She is currently on Seroquel 50 mg in the morning 25 mg at around 11:00 a.m. and 75 mg night prior to bedtime.  She is inquiring with equal to 100 mg at night.  She states that her mother's full of energy.  No problems elopement.    ROS:  Please see patient and review of systems unable to properly obtain since the patient has Alzheimer's.Answers for HPI/ROS submitted by the patient on 1/28/2020   activity change: No  unexpected weight change: No  neck pain: No  hearing loss: No  rhinorrhea: No  trouble swallowing: No  eye discharge: No  visual disturbance: No  chest tightness: No  wheezing: No  chest pain: No  palpitations: No  blood in stool: No  constipation: No  vomiting: No  diarrhea: No  polydipsia: No  polyuria: No  difficulty urinating: No  hematuria: No  menstrual problem: No  dysuria: No  joint swelling: No  arthralgias: No  headaches: No  weakness: No  confusion: No  dysphoric mood: No    Physical exam:  General appearance no acute distress.  HEENT:  Trachea is midline without JVD.  Pulmonary:  Good inspiratory, expiratory breath sounds are heard.  Lungs clear to auscultation.  Cardiovascular:  S1-S2, rhythm is normal.  The patient did have a 1 to 2/6 systolic ejection murmur best at left sternal border.  Extremities without edema.  GI:  Abdomen was nontender.  She had normal bowel sounds.  Comments:  Did discuss patient's daughter about seeing Neurology the future.  She would like to try the increased dose of Seroquel 1st.  It was also discussed that if she made appointment now and if things improve  she can always cancel it.    Assessment:  1.  Alzheimer's dementia  2.  Elevated glucose  3.  Hypertension  4.  Hyperlipidemia  5.  Aortic stenosis  Plan:  1.  Will schedule a CMP, A1c and lipid panel today  2.  Will increase patient's Seroquel 1 mg at night  3.  Will put a referral in to see Neurology.

## 2020-02-04 ENCOUNTER — TELEPHONE (OUTPATIENT)
Dept: INTERNAL MEDICINE | Facility: CLINIC | Age: 80
End: 2020-02-04

## 2020-02-04 ENCOUNTER — TELEPHONE (OUTPATIENT)
Dept: NEUROLOGY | Facility: CLINIC | Age: 80
End: 2020-02-04

## 2020-02-04 ENCOUNTER — PATIENT MESSAGE (OUTPATIENT)
Dept: INTERNAL MEDICINE | Facility: CLINIC | Age: 80
End: 2020-02-04

## 2020-02-04 LAB
ALBUMIN SERPL BCP-MCNC: 3.8 G/DL (ref 3.5–5.2)
ALP SERPL-CCNC: 86 U/L (ref 55–135)
ALT SERPL W/O P-5'-P-CCNC: 16 U/L (ref 10–44)
ANION GAP SERPL CALC-SCNC: 16 MMOL/L (ref 8–16)
AST SERPL-CCNC: 23 U/L (ref 10–40)
BILIRUB SERPL-MCNC: 0.3 MG/DL (ref 0.1–1)
BUN SERPL-MCNC: 24 MG/DL (ref 8–23)
CALCIUM SERPL-MCNC: 10 MG/DL (ref 8.7–10.5)
CHLORIDE SERPL-SCNC: 107 MMOL/L (ref 95–110)
CHOLEST SERPL-MCNC: 183 MG/DL (ref 120–199)
CHOLEST/HDLC SERPL: 4.5 {RATIO} (ref 2–5)
CO2 SERPL-SCNC: 22 MMOL/L (ref 23–29)
CREAT SERPL-MCNC: 1 MG/DL (ref 0.5–1.4)
EST. GFR  (AFRICAN AMERICAN): >60 ML/MIN/1.73 M^2
EST. GFR  (NON AFRICAN AMERICAN): 53.3 ML/MIN/1.73 M^2
GLUCOSE SERPL-MCNC: 122 MG/DL (ref 70–110)
HDLC SERPL-MCNC: 41 MG/DL (ref 40–75)
HDLC SERPL: 22.4 % (ref 20–50)
LDLC SERPL CALC-MCNC: 97.8 MG/DL (ref 63–159)
NONHDLC SERPL-MCNC: 142 MG/DL
POTASSIUM SERPL-SCNC: 4.5 MMOL/L (ref 3.5–5.1)
PROT SERPL-MCNC: 7.6 G/DL (ref 6–8.4)
SODIUM SERPL-SCNC: 145 MMOL/L (ref 136–145)
TRIGL SERPL-MCNC: 221 MG/DL (ref 30–150)

## 2020-02-04 NOTE — TELEPHONE ENCOUNTER
----- Message from Kevin Han MD sent at 2/4/2020 11:11 AM CST -----  Please contact patient. Her blood tests looked good. Please schedule a follow up with me in four months.

## 2020-02-04 NOTE — TELEPHONE ENCOUNTER
----- Message from Nicholas Marks sent at 2/4/2020 10:38 AM CST -----  Contact: pt's daughter @ [phone] or   Calling to schedule f/u for 03/05/20, last seen 2018

## 2020-02-07 RX ORDER — TIMOLOL MALEATE 5 MG/ML
1 SOLUTION/ DROPS OPHTHALMIC EVERY MORNING
Qty: 15 ML | Refills: 3 | Status: SHIPPED | OUTPATIENT
Start: 2020-02-07

## 2020-02-27 ENCOUNTER — TELEPHONE (OUTPATIENT)
Dept: RESEARCH | Facility: HOSPITAL | Age: 80
End: 2020-02-27

## 2020-02-27 ENCOUNTER — TELEPHONE (OUTPATIENT)
Dept: NEUROLOGY | Facility: CLINIC | Age: 80
End: 2020-02-27

## 2020-02-27 NOTE — TELEPHONE ENCOUNTER
Spoke with Amy she states pt is not sleeping been up since 11 o'clock last night, pt is agitated, yelling, screaming, pt is scheduled for an in May, seroquel is not working. Amy is wanting a new medication to help the pt sleep, nerves and being calm [facility]. Amy advised Vi is in clinic, a message will be forward to Vi for review. Amy voiced understanding

## 2020-02-27 NOTE — TELEPHONE ENCOUNTER
Spoke to dtr. I have not seen her mom since 2018.   Recently had quetiapine increased (within past couple of weeks) to 100 mg qhs (taking 5:30P). Not helping.    Also takes quetiapine 50 mg in AM and 25 mg at 11AM.     On memantine 10 mg BID. This can cause irritability / agitation. I have recommend that she stop at least the bedtime dose if not stop it all together to see if this helps. Seroquel may work better if memantine is not on board.     I have also talked to daughter about [facility]. She may be interested. I will ask them to call.    I will also see if we can get her an appt with the [facility].

## 2020-02-27 NOTE — TELEPHONE ENCOUNTER
----- Message from Nicholas Marks sent at 2/27/2020  8:11 AM CST -----  Contact: daughter @ [phone] or   Is asking to speak w/ the nurse about pt not sleeping at night, pt is agitated and yelling. Needing another medication for pt to help pt sleep, states Seroquel is not working anymore.

## 2020-02-28 ENCOUNTER — INITIAL CONSULT (OUTPATIENT)
Dept: NEUROLOGY | Facility: CLINIC | Age: 80
End: 2020-02-28
Payer: MEDICARE

## 2020-02-28 DIAGNOSIS — F03.90 NEURODEGENERATIVE DEMENTIA WITHOUT BEHAVIORAL DISTURBANCE: ICD-10-CM

## 2020-02-28 PROCEDURE — 96116 NUBHVL XM PHYS/QHP 1ST HR: CPT | Mod: HCNC,S$GLB,, | Performed by: CLINICAL NEUROPSYCHOLOGIST

## 2020-02-28 PROCEDURE — 99499 NO LOS: ICD-10-PCS | Mod: HCNC,S$GLB,, | Performed by: CLINICAL NEUROPSYCHOLOGIST

## 2020-02-28 PROCEDURE — 99499 UNLISTED E&M SERVICE: CPT | Mod: HCNC,S$GLB,, | Performed by: CLINICAL NEUROPSYCHOLOGIST

## 2020-02-28 PROCEDURE — 96116 PR NEUROBEHAVIORAL STATUS EXAM BY PSYCH/PHYS: ICD-10-PCS | Mod: HCNC,S$GLB,, | Performed by: CLINICAL NEUROPSYCHOLOGIST

## 2020-02-28 NOTE — PROGRESS NOTES
"NEUROBEHAVIORAL STATUS EXAMINATION - [facility] - NEUROPSYCHOLOGY VISIT    Referring Provider: Vi Lindquist NP   Medical Necessity: Supportive therapy, treatment planning, and treatment management in the setting of late stage Alzheimer's disease  Date Conducted:  2020  [id number] = 35 minutes  Present at Visit: Patient, daughter & primary caregiver, Amy, and two of the patient's grandchildren.     ASSESSMENT & PLAN:     Ms. Layla Arellano is an 79 y.o., left-handed, female who is in the late stage of Alzheimer's disease. Primary reason for appointment is to determine if  the patient's medication dosages should be further altered/adjusted. While adjustment to medication was considered the primary concern for this appointment, her daughter does not wish to make any additional alterations until determining if those recently made will continue to be beneficial.      Problem List Items Addressed This Visit        Neuro    Neurodegenerative dementia without behavioral disturbance    Current Assessment & Plan     An introduction to this clinic was provided to the patient's family.     The patient's daughter was commended for the care that she is providing to the patient.     Discussed various behavioral management techniques to utilize when providing care to the patient, including playing music; speaking softly to the patient and explaining what they are doing as they are doing it; reiterating to the patient that she is "safe" while needing to touch her.     Social work plans to touch base with the patient's daughter over the next week.     [facility] reached out again but the patient's daughter was not interested in receiving these services at this time.     RTC PRN. Patient's daughter is interested in following-up with Dr. Lindquist and stated that she would be in touch should she wish to return to this clinic at a future time.              If you have any questions, please contact me " "at [phone].    Radha Mendoza, PhD  Licensed Clinical Neuropsychologist  [facility] - Department of Neurology    SUBJECTIVE:     Patient Report:   Unable to participate in the appointment today     Caregiver Report:   Things are going "okay" overall.   Daughter stated that Seroquel is helping at night "sometimes" but that it did not work at all two nights ago and the patient ended up staying up all night. She called Vi Lindquist who advised that her nighttime Namenda be stopped to see if that was causing any problems. Last night was her first night without her nighttime dose and she fully slept through the night. Her daughter stated that she was unsure if this was due to exhaustion or medication effects. While adjustment to medication was considered the primary concern for this appointment, her daughter does not wish to make any additional alterations until determining if those recently made will continue to be beneficial.      Cognition: Patient is periodically folding clothes to keep her hands busy.   Mood/Neuropsychiatric Sxs: Patient reportedly becomes agitated and aggressive when someone is attempting to touch her to take her jacket off, bathe her, etc. Music has been used to help soothe her. Seroquel is no longer helping all the time.   Oversight/Safety Concerns: The patient did qualify for Hospice for a period of time and then home health for a period of time; however, she has been doing better overall (in terms of weight and eating) and thus, she has not qualified for those services for the past three months. Patient is now only receiving assistance from her primary caregiver and occasionally from another daughter.   Caregiver [place]: Moderate - primary caregiver (daughter Amy) is given relief from her sister, who provides oversight to the patient on "some weekends."      OBJECTIVE:     VITALS TAKEN BY NURSING:  Blood Pressure: 118/80  Heart Rate: 90 BPM    MENTAL STATUS AND " OBSERVATIONS:   Appearance: Casually dressed and adequate grooming/hygiene.   Alertness: Alert but not attentive. Would periodically look at people in the room.   Gait: Remained in a wheelchair  Motor movements/mannerisms: Unremarkable  Speech/language: Patient would mumble and periodically yell unintelligible sounds   Mood/Affect: The patients mood and affect were congruent and ranged from euthymic to nervous/fearful. The patient was periodically comforted by this provider by speaking softly and slowly and holding the patient's hand.   Interpersonal Behavior: Rapport was established with the patient's family.   Suicidality/Homicidality: Denied  Hallucinations/Delusions: None evidenced or endorsed  Thought Content & Processes: Reduced   Insight & Judgment: Reduced  Participation in Clinical Interview: Dependent on collateral

## 2020-02-28 NOTE — RESEARCH
Met with daughter and discussed [facility] [employer] Research study. CG hesitant due to time commitment. Care team to f/u next week to discuss options.

## 2020-03-01 NOTE — ASSESSMENT & PLAN NOTE
"An introduction to this clinic was provided to the patient's family.     The patient's daughter was commended for the care that she is providing to the patient.     Discussed various behavioral management techniques to utilize when providing care to the patient, including playing music; speaking softly to the patient and explaining what they are doing as they are doing it; reiterating to the patient that she is "safe" while needing to touch her.     Social work plans to touch base with the patient's daughter over the next week.     [facility] reached out again but the patient's daughter was not interested in receiving these services at this time.     RTC PRN. Patient's daughter is interested in following-up with Dr. Lindquist and stated that she would be in touch should she wish to return to this clinic at a future time.   "

## 2020-03-04 ENCOUNTER — TELEPHONE (OUTPATIENT)
Dept: RESEARCH | Facility: HOSPITAL | Age: 80
End: 2020-03-04

## 2020-03-05 ENCOUNTER — TELEPHONE (OUTPATIENT)
Dept: INTERNAL MEDICINE | Facility: CLINIC | Age: 80
End: 2020-03-05

## 2020-03-05 NOTE — TELEPHONE ENCOUNTER
----- Message from Angeles Hassan sent at 3/5/2020 10:09 AM CST -----  Contact: Daughter [phone]  Requesting a call about her mother's strange behavior.       Please call and advise.    Thank You

## 2020-03-05 NOTE — TELEPHONE ENCOUNTER
"I spoke to the daughter. She states that they want a 90 day supply of the Seroquel 25 mg. This will need a PA because the insurance is rejecting 5 tablets a day. What is the reason for the 5 a day? I will need this information in order to obtain a PA. Let me know. They may cover a different strength with out the quantity of pills. I spoke to [facility] and the family was able to  #120 tablets at a cost of 11.30 cents for a 30 day supply today. The daughter is also complaining that her mother is yelling and screaming at her and she wants a medication for anxiety or a "nerve pill". She states that they cannot get in to see a neurologist until May 2020.  "

## 2020-03-10 ENCOUNTER — TELEPHONE (OUTPATIENT)
Dept: INTERNAL MEDICINE | Facility: CLINIC | Age: 80
End: 2020-03-10

## 2020-03-10 DIAGNOSIS — G30.9 ALZHEIMER'S DEMENTIA WITH BEHAVIORAL DISTURBANCE, UNSPECIFIED TIMING OF DEMENTIA ONSET: Primary | ICD-10-CM

## 2020-03-10 DIAGNOSIS — F02.81 ALZHEIMER'S DEMENTIA WITH BEHAVIORAL DISTURBANCE, UNSPECIFIED TIMING OF DEMENTIA ONSET: Primary | ICD-10-CM

## 2020-03-10 NOTE — TELEPHONE ENCOUNTER
Daughter calling. Pt is refusing to open mouth to eat soiled food. This has been going on since Saturday. She is able to drink the Glucerna.

## 2020-03-10 NOTE — TELEPHONE ENCOUNTER
----- Message from Maria Fernanda Stewart sent at 3/10/2020  7:24 AM CDT -----  Contact: Amy/daughter/ [phone] or cell  [phone]  Would like to get medical advice.  Symptoms (please be specific):  Patient is not eating solid foods and biting down on her teeth, rejecting food  How long has patient had these symptoms:  Since the weekend  Pharmacy name and phone #:    Any drug allergies (copy from chart):      Would the patient rather a call back or a response via [facility]?:    Comments:

## 2020-03-11 ENCOUNTER — OFFICE VISIT (OUTPATIENT)
Dept: INTERNAL MEDICINE | Facility: CLINIC | Age: 80
End: 2020-03-11
Payer: MEDICARE

## 2020-03-11 ENCOUNTER — LAB VISIT (OUTPATIENT)
Dept: LAB | Facility: HOSPITAL | Age: 80
End: 2020-03-11
Attending: PHYSICIAN ASSISTANT
Payer: MEDICARE

## 2020-03-11 ENCOUNTER — TELEPHONE (OUTPATIENT)
Dept: INTERNAL MEDICINE | Facility: CLINIC | Age: 80
End: 2020-03-11

## 2020-03-11 VITALS — OXYGEN SATURATION: 95 % | HEART RATE: 89 BPM | SYSTOLIC BLOOD PRESSURE: 122 MMHG | DIASTOLIC BLOOD PRESSURE: 82 MMHG

## 2020-03-11 DIAGNOSIS — G30.9 ALZHEIMER'S DEMENTIA WITH BEHAVIORAL DISTURBANCE, UNSPECIFIED TIMING OF DEMENTIA ONSET: Primary | ICD-10-CM

## 2020-03-11 DIAGNOSIS — F02.81 ALZHEIMER'S DEMENTIA WITH BEHAVIORAL DISTURBANCE, UNSPECIFIED TIMING OF DEMENTIA ONSET: ICD-10-CM

## 2020-03-11 DIAGNOSIS — G30.9 ALZHEIMER'S DEMENTIA WITH BEHAVIORAL DISTURBANCE, UNSPECIFIED TIMING OF DEMENTIA ONSET: ICD-10-CM

## 2020-03-11 DIAGNOSIS — F02.81 ALZHEIMER'S DEMENTIA WITH BEHAVIORAL DISTURBANCE, UNSPECIFIED TIMING OF DEMENTIA ONSET: Primary | ICD-10-CM

## 2020-03-11 LAB
ANION GAP SERPL CALC-SCNC: 10 MMOL/L (ref 8–16)
BASOPHILS # BLD AUTO: 0.05 K/UL (ref 0–0.2)
BASOPHILS NFR BLD: 0.4 % (ref 0–1.9)
BUN SERPL-MCNC: 23 MG/DL (ref 8–23)
CALCIUM SERPL-MCNC: 9.9 MG/DL (ref 8.7–10.5)
CHLORIDE SERPL-SCNC: 107 MMOL/L (ref 95–110)
CO2 SERPL-SCNC: 25 MMOL/L (ref 23–29)
CREAT SERPL-MCNC: 0.8 MG/DL (ref 0.5–1.4)
DIFFERENTIAL METHOD: ABNORMAL
EOSINOPHIL # BLD AUTO: 0.2 K/UL (ref 0–0.5)
EOSINOPHIL NFR BLD: 1.5 % (ref 0–8)
ERYTHROCYTE [DISTWIDTH] IN BLOOD BY AUTOMATED COUNT: 14.5 % (ref 11.5–14.5)
EST. GFR  (AFRICAN AMERICAN): >60 ML/MIN/1.73 M^2
EST. GFR  (NON AFRICAN AMERICAN): >60 ML/MIN/1.73 M^2
GLUCOSE SERPL-MCNC: 134 MG/DL (ref 70–110)
HCT VFR BLD AUTO: 45 % (ref 37–48.5)
HGB BLD-MCNC: 14.4 G/DL (ref 12–16)
LYMPHOCYTES # BLD AUTO: 2 K/UL (ref 1–4.8)
LYMPHOCYTES NFR BLD: 15.4 % (ref 18–48)
MCH RBC QN AUTO: 29.4 PG (ref 27–31)
MCHC RBC AUTO-ENTMCNC: 32 G/DL (ref 32–36)
MCV RBC AUTO: 92 FL (ref 82–98)
MONOCYTES # BLD AUTO: 1.4 K/UL (ref 0.3–1)
MONOCYTES NFR BLD: 10.9 % (ref 4–15)
NEUTROPHILS # BLD AUTO: 9.3 K/UL (ref 1.8–7.7)
NEUTROPHILS NFR BLD: 71.8 % (ref 38–73)
PLATELET # BLD AUTO: 241 K/UL (ref 150–350)
PMV BLD AUTO: 13.4 FL (ref 9.2–12.9)
POTASSIUM SERPL-SCNC: 4.7 MMOL/L (ref 3.5–5.1)
RBC # BLD AUTO: 4.9 M/UL (ref 4–5.4)
SODIUM SERPL-SCNC: 142 MMOL/L (ref 136–145)
T4 FREE SERPL-MCNC: 1.13 NG/DL (ref 0.71–1.51)
TSH SERPL DL<=0.005 MIU/L-ACNC: 4.9 UIU/ML (ref 0.4–4)
WBC # BLD AUTO: 13.05 K/UL (ref 3.9–12.7)

## 2020-03-11 PROCEDURE — 99999 PR PBB SHADOW E&M-EST. PATIENT-LVL IV: ICD-10-PCS | Mod: PBBFAC,HCNC,, | Performed by: PHYSICIAN ASSISTANT

## 2020-03-11 PROCEDURE — 1126F AMNT PAIN NOTED NONE PRSNT: CPT | Mod: HCNC,S$GLB,, | Performed by: PHYSICIAN ASSISTANT

## 2020-03-11 PROCEDURE — 3079F PR MOST RECENT DIASTOLIC BLOOD PRESSURE 80-89 MM HG: ICD-10-PCS | Mod: HCNC,CPTII,S$GLB, | Performed by: PHYSICIAN ASSISTANT

## 2020-03-11 PROCEDURE — 36415 COLL VENOUS BLD VENIPUNCTURE: CPT | Mod: HCNC

## 2020-03-11 PROCEDURE — 1101F PT FALLS ASSESS-DOCD LE1/YR: CPT | Mod: HCNC,CPTII,S$GLB, | Performed by: PHYSICIAN ASSISTANT

## 2020-03-11 PROCEDURE — 1159F PR MEDICATION LIST DOCUMENTED IN MEDICAL RECORD: ICD-10-PCS | Mod: HCNC,S$GLB,, | Performed by: PHYSICIAN ASSISTANT

## 2020-03-11 PROCEDURE — 99214 PR OFFICE/OUTPT VISIT, EST, LEVL IV, 30-39 MIN: ICD-10-PCS | Mod: HCNC,S$GLB,, | Performed by: PHYSICIAN ASSISTANT

## 2020-03-11 PROCEDURE — 3074F PR MOST RECENT SYSTOLIC BLOOD PRESSURE < 130 MM HG: ICD-10-PCS | Mod: HCNC,CPTII,S$GLB, | Performed by: PHYSICIAN ASSISTANT

## 2020-03-11 PROCEDURE — 80048 BASIC METABOLIC PNL TOTAL CA: CPT | Mod: HCNC

## 2020-03-11 PROCEDURE — 84439 ASSAY OF FREE THYROXINE: CPT | Mod: HCNC

## 2020-03-11 PROCEDURE — 3079F DIAST BP 80-89 MM HG: CPT | Mod: HCNC,CPTII,S$GLB, | Performed by: PHYSICIAN ASSISTANT

## 2020-03-11 PROCEDURE — 99499 RISK ADDL DX/OHS AUDIT: ICD-10-PCS | Mod: S$GLB,,, | Performed by: PHYSICIAN ASSISTANT

## 2020-03-11 PROCEDURE — 1101F PR PT FALLS ASSESS DOC 0-1 FALLS W/OUT INJ PAST YR: ICD-10-PCS | Mod: HCNC,CPTII,S$GLB, | Performed by: PHYSICIAN ASSISTANT

## 2020-03-11 PROCEDURE — 3074F SYST BP LT 130 MM HG: CPT | Mod: HCNC,CPTII,S$GLB, | Performed by: PHYSICIAN ASSISTANT

## 2020-03-11 PROCEDURE — 99499 UNLISTED E&M SERVICE: CPT | Mod: S$GLB,,, | Performed by: PHYSICIAN ASSISTANT

## 2020-03-11 PROCEDURE — 99214 OFFICE O/P EST MOD 30 MIN: CPT | Mod: HCNC,S$GLB,, | Performed by: PHYSICIAN ASSISTANT

## 2020-03-11 PROCEDURE — 85025 COMPLETE CBC W/AUTO DIFF WBC: CPT | Mod: HCNC

## 2020-03-11 PROCEDURE — 84443 ASSAY THYROID STIM HORMONE: CPT | Mod: HCNC

## 2020-03-11 PROCEDURE — 1159F MED LIST DOCD IN RCRD: CPT | Mod: HCNC,S$GLB,, | Performed by: PHYSICIAN ASSISTANT

## 2020-03-11 PROCEDURE — 1126F PR PAIN SEVERITY QUANTIFIED, NO PAIN PRESENT: ICD-10-PCS | Mod: HCNC,S$GLB,, | Performed by: PHYSICIAN ASSISTANT

## 2020-03-11 PROCEDURE — 99999 PR PBB SHADOW E&M-EST. PATIENT-LVL IV: CPT | Mod: PBBFAC,HCNC,, | Performed by: PHYSICIAN ASSISTANT

## 2020-03-11 NOTE — PROGRESS NOTES
Subjective:       Patient ID: Layla Arellano is a 79 y.o. female.    Chief Complaint: refusing to eat (since saturday )    HPI       Established pt of Kevin Han MD (new to me)    Pt attended by her daughter and Dean.     Here with concerns of patient refusing to eat for the past 3-4 days. States she will take only a few bites. She will drink about 3 Glucerna shakes a day. Having BMs and making wet diapers. Daughter states similar occurences about 1.5 years ago and pt was placed on Hospice. Daughter wants to initiate Hospice again with [facility]    Notes recent medication changes by Neurology, namenda stopped. Taking up to 5 seroquel a day, sleep is waxing and waning. Some days she sleeps throughout the night, other days she says awake.       Past Medical History:   Diagnosis Date    Alzheimer's disease     mostly non-verbal.      Aortic stenosis     Arthritis     bilateral knee pain    CKD (chronic kidney disease) stage 3, GFR 30-59 ml/min 6/12/2015    Glaucoma     Hyperlipidemia     Hypertension     Macular degeneration - Both Eyes 5/10/2013    Meningioma 5/3/2016    Obesity 8/15/2017    Osteoporosis 5/3/2016     Social History     Tobacco Use    Smoking status: Never Smoker    Smokeless tobacco: Never Used   Substance Use Topics    Alcohol use: No     Frequency: Never     Drinks per session: Patient refused     Binge frequency: Never     Comment: rarely    Drug use: No     Review of patient's allergies indicates:  No Known Allergies        Review of Systems   Unable to perform ROS: Dementia       Objective: /82   Pulse 89   SpO2 95%         Physical Exam   Constitutional: She appears well-developed and well-nourished.   HENT:   Head: Normocephalic and atraumatic.   Cardiovascular: Normal rate and regular rhythm.   Pulmonary/Chest: Effort normal and breath sounds normal. No respiratory distress.   Abdominal: Soft. Bowel sounds are normal. There is no guarding.   Neurological:  She is alert.   Skin: Skin is warm and dry.       Assessment:       1. Alzheimer's dementia with behavioral disturbance, unspecified timing of dementia onset        Plan:         Layla was seen today for refusing to eat.    Diagnoses and all orders for this visit:    Alzheimer's dementia with behavioral disturbance, unspecified timing of dementia onset  -     Basic metabolic panel; Future  -     CBC auto differential; Future  -     TSH; Future  -     Ambulatory referral/consult to Hospice; Future      Stat lab obtained  Spoke with Chen at [facility], verbal order placed but will need PCP signature (per [facility] signature note accepted for Hospice)  I discussed with Dr. Han staff and routed pended Epic order for Hospice  Advised to increase Glucerna to 4 bottles a day, consider adding some protein powder as recommend by Neuro in the past   Offer her favorite foods  ED precautions discussed     Vilma Mccracken PA-C

## 2020-03-11 NOTE — TELEPHONE ENCOUNTER
Referral for Hospice called in to [facility] today, spoke with Chen. They will contact the family to setup visit. They will likely need the order faxed/signed from Dr. Han as well.

## 2020-03-11 NOTE — TELEPHONE ENCOUNTER
----- Message from Layla David sent at 3/11/2020  7:33 AM CDT -----  Contact: Amy [phone] or khiq [phone]  Patient's daughter Amy is waiting for a reply from the nurse. She left a message yesterday about her mother's condition. She is refusing to eat and has not been sleeping . She accepted an appt for today with Lila Mccracken but still wants to hear from you regarding 's reply from yesterday's message .

## 2020-03-12 ENCOUNTER — TELEPHONE (OUTPATIENT)
Dept: NEUROLOGY | Facility: CLINIC | Age: 80
End: 2020-03-12

## 2020-03-12 ENCOUNTER — TELEPHONE (OUTPATIENT)
Dept: INTERNAL MEDICINE | Facility: CLINIC | Age: 80
End: 2020-03-12

## 2020-03-12 DIAGNOSIS — R62.51 FAILURE TO THRIVE (0-17): ICD-10-CM

## 2020-03-12 DIAGNOSIS — F02.81 ALZHEIMER'S DEMENTIA WITH BEHAVIORAL DISTURBANCE, UNSPECIFIED TIMING OF DEMENTIA ONSET: Primary | ICD-10-CM

## 2020-03-12 DIAGNOSIS — G30.9 ALZHEIMER'S DEMENTIA WITH BEHAVIORAL DISTURBANCE, UNSPECIFIED TIMING OF DEMENTIA ONSET: Primary | ICD-10-CM

## 2020-03-12 NOTE — TELEPHONE ENCOUNTER
Spoke with Amy pt's daughter and she would like the referral sent to [facility]. Also informed that referral will be faxed over to specified location.  Put in referral for Hospice and faxed to [facility] and requested by Pt's daughter Amy and put on referral ok to speak with daughter and added her phone numbers as well.

## 2020-03-12 NOTE — TELEPHONE ENCOUNTER
----- Message from Seema Okeefe sent at 3/12/2020  8:03 AM CDT -----  Contact: Pts daughter Ms. Reyes Mobile # [phone] or Home # [phone]  Ms. Reyes would like a call back to speak with you about her wanting to know if you have put in the orders for her mother to get hospice services?

## 2020-03-12 NOTE — TELEPHONE ENCOUNTER
After reviewing office note from [facility] Mccracken yesterday describing her refusal to eat, I called ida and discussed option of reducing/stopping donepezil to see if this helps as can cause anorexia. She noted Hospice has also been called.   She will let us know if we can be of further assistance and was appreciative of call.

## 2020-03-12 NOTE — TELEPHONE ENCOUNTER
----- Message from Macrina Pelayo sent at 3/12/2020  2:00 PM CDT -----  Contact: Amy     [phone]  Amy is calling to f/u on the hospice orders. Amy states this is her 2nd call to the office.  Please call and advise

## 2020-04-02 DIAGNOSIS — H40.89 GLAUCOMA DUE TO COMBINATION OF MECHANISMS: ICD-10-CM

## 2020-04-02 RX ORDER — BIMATOPROST 0.1 MG/ML
SOLUTION/ DROPS OPHTHALMIC
Qty: 3 BOTTLE | Refills: 3 | Status: SHIPPED | OUTPATIENT
Start: 2020-04-02

## 2020-05-21 ENCOUNTER — PATIENT OUTREACH (OUTPATIENT)
Dept: ADMINISTRATIVE | Facility: HOSPITAL | Age: 80
End: 2020-05-21

## 2021-04-05 ENCOUNTER — PATIENT MESSAGE (OUTPATIENT)
Dept: ADMINISTRATIVE | Facility: HOSPITAL | Age: 81
End: 2021-04-05
